# Patient Record
Sex: MALE | Race: OTHER | NOT HISPANIC OR LATINO
[De-identification: names, ages, dates, MRNs, and addresses within clinical notes are randomized per-mention and may not be internally consistent; named-entity substitution may affect disease eponyms.]

---

## 2021-02-18 ENCOUNTER — APPOINTMENT (OUTPATIENT)
Dept: UROLOGY | Facility: CLINIC | Age: 71
End: 2021-02-18

## 2021-03-18 ENCOUNTER — APPOINTMENT (OUTPATIENT)
Dept: UROLOGY | Facility: CLINIC | Age: 71
End: 2021-03-18
Payer: MEDICARE

## 2021-03-18 VITALS
WEIGHT: 262 LBS | SYSTOLIC BLOOD PRESSURE: 115 MMHG | TEMPERATURE: 98.3 F | OXYGEN SATURATION: 96 % | DIASTOLIC BLOOD PRESSURE: 74 MMHG | HEART RATE: 86 BPM | HEIGHT: 66 IN | BODY MASS INDEX: 42.11 KG/M2

## 2021-03-18 DIAGNOSIS — Z00.00 ENCOUNTER FOR GENERAL ADULT MEDICAL EXAMINATION W/OUT ABNORMAL FINDINGS: ICD-10-CM

## 2021-03-18 DIAGNOSIS — Z80.0 FAMILY HISTORY OF MALIGNANT NEOPLASM OF DIGESTIVE ORGANS: ICD-10-CM

## 2021-03-18 DIAGNOSIS — R60.0 LOCALIZED EDEMA: ICD-10-CM

## 2021-03-18 DIAGNOSIS — E11.9 TYPE 2 DIABETES MELLITUS W/OUT COMPLICATIONS: ICD-10-CM

## 2021-03-18 DIAGNOSIS — I50.9 HEART FAILURE, UNSPECIFIED: ICD-10-CM

## 2021-03-18 DIAGNOSIS — Z78.9 OTHER SPECIFIED HEALTH STATUS: ICD-10-CM

## 2021-03-18 DIAGNOSIS — Z83.3 FAMILY HISTORY OF DIABETES MELLITUS: ICD-10-CM

## 2021-03-18 DIAGNOSIS — I10 ESSENTIAL (PRIMARY) HYPERTENSION: ICD-10-CM

## 2021-03-18 DIAGNOSIS — I73.9 PERIPHERAL VASCULAR DISEASE, UNSPECIFIED: ICD-10-CM

## 2021-03-18 PROCEDURE — 76857 US EXAM PELVIC LIMITED: CPT

## 2021-03-18 PROCEDURE — 99205 OFFICE O/P NEW HI 60 MIN: CPT

## 2021-03-18 RX ORDER — LOSARTAN POTASSIUM 100 MG/1
TABLET, FILM COATED ORAL
Refills: 0 | Status: ACTIVE | COMMUNITY

## 2021-03-18 RX ORDER — EMPAGLIFLOZIN, LINAGLIPTIN, METFORMIN HYDROCHLORIDE 25; 5; 1000 MG/1; MG/1; MG/1
TABLET, EXTENDED RELEASE ORAL
Refills: 0 | Status: ACTIVE | COMMUNITY

## 2021-03-18 RX ORDER — FINASTERIDE 1 MG/1
TABLET ORAL
Refills: 0 | Status: ACTIVE | COMMUNITY

## 2021-03-18 RX ORDER — CALCITRIOL 0.25 UG/1
0.25 CAPSULE, LIQUID FILLED ORAL
Refills: 0 | Status: ACTIVE | COMMUNITY

## 2021-03-18 RX ORDER — CARVEDILOL 3.12 MG/1
TABLET, FILM COATED ORAL
Refills: 0 | Status: ACTIVE | COMMUNITY

## 2021-03-18 RX ORDER — ASPIRIN ENTERIC COATED TABLETS 81 MG 81 MG/1
81 TABLET, DELAYED RELEASE ORAL
Refills: 0 | Status: ACTIVE | COMMUNITY

## 2021-03-18 RX ORDER — EMPAGLIFLOZIN 25 MG/1
TABLET, FILM COATED ORAL
Refills: 0 | Status: ACTIVE | COMMUNITY

## 2021-03-18 RX ORDER — LANSOPRAZOLE 30 MG/1
CAPSULE, DELAYED RELEASE ORAL
Refills: 0 | Status: ACTIVE | COMMUNITY

## 2021-03-18 RX ORDER — GLIPIZIDE 2.5 MG/1
TABLET ORAL
Refills: 0 | Status: ACTIVE | COMMUNITY

## 2021-03-18 RX ORDER — TADALAFIL 5 MG/1
TABLET, FILM COATED ORAL
Refills: 0 | Status: ACTIVE | COMMUNITY

## 2021-03-18 NOTE — LETTER BODY
[Dear  ___] : Dear  [unfilled], [Consult Letter:] : I had the pleasure of evaluating your patient, [unfilled]. [Please see my note below.] : Please see my note below. [Consult Closing:] : Thank you very much for allowing me to participate in the care of this patient.  If you have any questions, please do not hesitate to contact me. [Sincerely,] : Sincerely, [FreeTextEntry3] : Shiraz Barnhart MD, FACS

## 2021-03-18 NOTE — HISTORY OF PRESENT ILLNESS
[FreeTextEntry1] : Mr. AURA DEL VALLE comes in today for a urologic evaluation.  He presents with a 5-6 year onset of obstructive and irritative voiding symptoms. He also has urge incontinence and wears 1 pad/day.  He was diagnosed with a urethral stricture for which he underwent two office dilations followed by an in-hospital dilation and direct visual internal laser urethrotomy (DVIU) in July 2020. The procedure was unsuccessful in alleviating his symptoms and a Rodriguez was placed emergently in September.  Then, in October he had a percutaneous cystostomy tube placed (in the OR by Dr. Owens using cystoscopic guidance).  Mr. Del Valle had another urethral office dilation in December.  His voiding symptoms have not improved. He has been on Flomax 0.4mg and Proscar for several years.  Cialis 5mg daily was added 6 mths ago. \par \par IPSS: 24/35\par Sono: 73cc PVR; Prostate 26cc\par \par Mr. Del Valle has ED, but is not sexually active. \par \par Creat:  2/17/21--2.07\par

## 2021-03-18 NOTE — ASSESSMENT
[FreeTextEntry1] : I discussed the findings and options with Mr. and . AURA LOPEZ in detail and reviewed the records provided, including two operative reports.  The next step should be the appropriate urethral imaging.  Prior to that, we will obtain a urinary flow rate.  I described the procedures and he will schedule them accordingly.\par \par I have also asked Mr. Lopez to please bring me his most recent PSA, which he reports as "normal".\par

## 2021-03-18 NOTE — ADDENDUM
[FreeTextEntry1] : A portion of this note was written by [Shane Webb] on 03/17/2021 acting as a scribe for Dr. Barnhart. \par \par I have personally reviewed the chart and agree that the record accurately reflects my personal performance of the history, physical exam, assessment, and plan.

## 2021-03-18 NOTE — PHYSICAL EXAM
[General Appearance - Well Developed] : well developed [General Appearance - Well Nourished] : well nourished [Normal Appearance] : normal appearance [Well Groomed] : well groomed [General Appearance - In No Acute Distress] : no acute distress [Respiration, Rhythm And Depth] : normal respiratory rhythm and effort [Exaggerated Use Of Accessory Muscles For Inspiration] : no accessory muscle use [Abdomen Tenderness] : non-tender [Costovertebral Angle Tenderness] : no ~M costovertebral angle tenderness [Urinary Bladder Findings] : the bladder was normal on palpation [Scrotum] : the scrotum was normal [Testes Mass (___cm)] : there were no testicular masses [No Prostate Nodules] : no prostate nodules [] : no rash [No Focal Deficits] : no focal deficits [Oriented To Time, Place, And Person] : oriented to person, place, and time [Affect] : the affect was normal [Mood] : the mood was normal [Not Anxious] : not anxious [No Palpable Adenopathy] : no palpable adenopathy [Heart Rate And Rhythm] : Heart rate and rhythm were normal [Abdomen Mass (___ Cm)] : no abdominal mass palpated [Penis Abnormality] : normal circumcised penis [Testes Tenderness] : no tenderness of the testes [Prostate Tenderness] : the prostate was not tender [FreeTextEntry1] : Extensive lower extremity discoloration.

## 2021-03-19 LAB
BILIRUB UR QL STRIP: NORMAL
CLARITY UR: CLEAR
COLLECTION METHOD: NORMAL
GLUCOSE UR-MCNC: 1000
HCG UR QL: 0.2 EU/DL
HGB UR QL STRIP.AUTO: NORMAL
KETONES UR-MCNC: NORMAL
LEUKOCYTE ESTERASE UR QL STRIP: NORMAL
NITRITE UR QL STRIP: POSITIVE
PH UR STRIP: 6
PROT UR STRIP-MCNC: NORMAL
SP GR UR STRIP: 1.01

## 2021-05-10 ENCOUNTER — APPOINTMENT (OUTPATIENT)
Dept: UROLOGY | Facility: CLINIC | Age: 71
End: 2021-05-10
Payer: MEDICARE

## 2021-05-10 VITALS
TEMPERATURE: 97.9 F | SYSTOLIC BLOOD PRESSURE: 160 MMHG | DIASTOLIC BLOOD PRESSURE: 79 MMHG | HEART RATE: 82 BPM | OXYGEN SATURATION: 95 %

## 2021-05-10 DIAGNOSIS — E66.01 MORBID (SEVERE) OBESITY DUE TO EXCESS CALORIES: ICD-10-CM

## 2021-05-10 LAB
BILIRUB UR QL STRIP: NORMAL
CLARITY UR: CLEAR
COLLECTION METHOD: NORMAL
GLUCOSE UR-MCNC: 1000
HCG UR QL: 0.2 EU/DL
HGB UR QL STRIP.AUTO: NORMAL
KETONES UR-MCNC: NORMAL
LEUKOCYTE ESTERASE UR QL STRIP: NORMAL
NITRITE UR QL STRIP: NORMAL
PH UR STRIP: 5
PROT UR STRIP-MCNC: NORMAL
SP GR UR STRIP: 1

## 2021-05-10 PROCEDURE — 51610 INJECTION FOR BLADDER X-RAY: CPT

## 2021-05-10 PROCEDURE — 51798 US URINE CAPACITY MEASURE: CPT

## 2021-05-10 PROCEDURE — 99215 OFFICE O/P EST HI 40 MIN: CPT | Mod: 25

## 2021-05-10 RX ORDER — ICOSAPENT ETHYL 1000 MG/1
1 CAPSULE ORAL
Qty: 120 | Refills: 0 | Status: ACTIVE | COMMUNITY
Start: 2021-04-12

## 2021-05-10 RX ORDER — GLIPIZIDE 10 MG/1
10 TABLET, FILM COATED, EXTENDED RELEASE ORAL
Qty: 90 | Refills: 0 | Status: ACTIVE | COMMUNITY
Start: 2021-03-16

## 2021-05-10 RX ORDER — EMPAGLIFLOZIN, METFORMIN HYDROCHLORIDE 25; 1000 MG/1; MG/1
25-1000 TABLET, EXTENDED RELEASE ORAL
Qty: 90 | Refills: 0 | Status: ACTIVE | COMMUNITY
Start: 2021-04-21

## 2021-05-10 RX ORDER — TADALAFIL 5 MG/1
5 TABLET ORAL
Qty: 90 | Refills: 0 | Status: ACTIVE | COMMUNITY
Start: 2020-07-13

## 2021-05-10 RX ORDER — SIMVASTATIN 5 MG/1
5 TABLET, FILM COATED ORAL
Qty: 90 | Refills: 0 | Status: ACTIVE | COMMUNITY
Start: 2021-04-19

## 2021-05-10 RX ORDER — CIPROFLOXACIN HYDROCHLORIDE 500 MG/1
500 TABLET, FILM COATED ORAL
Qty: 14 | Refills: 0 | Status: ACTIVE | COMMUNITY
Start: 2021-03-30

## 2021-05-10 RX ORDER — ASPIRIN 81 MG/1
81 TABLET, CHEWABLE ORAL
Qty: 90 | Refills: 0 | Status: ACTIVE | COMMUNITY
Start: 2020-12-14

## 2021-05-10 RX ORDER — OMEPRAZOLE 20 MG/1
20 CAPSULE, DELAYED RELEASE ORAL
Qty: 90 | Refills: 0 | Status: ACTIVE | COMMUNITY
Start: 2021-04-11

## 2021-05-10 RX ORDER — METOLAZONE 2.5 MG/1
2.5 TABLET ORAL
Qty: 26 | Refills: 0 | Status: ACTIVE | COMMUNITY
Start: 2021-04-28

## 2021-05-10 NOTE — HISTORY OF PRESENT ILLNESS
[FreeTextEntry1] : Mr. AURA DEL VALLE comes in today for a urologic evaluation and scheduled urethral imaging.  He presents with a 5-6 year onset of obstructive and irritative voiding symptoms. He also has urge incontinence and wears 1 pad/day.  He was diagnosed with a urethral stricture for which he underwent two office dilations followed by an in-hospital dilation and direct visual internal laser urethrotomy (DVIU) in July 2020. The procedure was unsuccessful in alleviating his symptoms and a Rodriguez was placed emergently in September.  Then, in October he had a percutaneous cystostomy tube placed (in the OR by Dr. Owens using cystoscopic guidance).  Mr. Del Valle had another urethral office dilation in December.  His voiding symptoms have not improved. He has been on Flomax 0.4mg and Proscar for several years.  Cialis 5mg daily was added 6 months ago. \par IPSS: 19/35\par Flow:  10ml/sec (187cc volume)\par \par Mr. Del Valle has ED, but is not sexually active. \par \par Creat:  2/17/21--2.07

## 2021-05-10 NOTE — ADDENDUM
[FreeTextEntry1] : A portion of this note was written by [Shane Webb] on 05/07/2021 acting as a scribe for Dr. Barnhart. \par \par I have personally reviewed the chart and agree that the record accurately reflects my personal performance of the history, physical exam, assessment, and plan.

## 2021-05-10 NOTE — PHYSICAL EXAM
[General Appearance - Well Developed] : well developed [General Appearance - Well Nourished] : well nourished [Normal Appearance] : normal appearance [Well Groomed] : well groomed [General Appearance - In No Acute Distress] : no acute distress [Abdomen Tenderness] : non-tender [Abdomen Mass (___ Cm)] : no abdominal mass palpated [Penis Abnormality] : normal circumcised penis [Urinary Bladder Findings] : the bladder was normal on palpation [Scrotum] : the scrotum was normal [Testes Tenderness] : no tenderness of the testes [Testes Mass (___cm)] : there were no testicular masses [Heart Rate And Rhythm] : Heart rate and rhythm were normal [] : no respiratory distress [Respiration, Rhythm And Depth] : normal respiratory rhythm and effort [Exaggerated Use Of Accessory Muscles For Inspiration] : no accessory muscle use [Oriented To Time, Place, And Person] : oriented to person, place, and time [Affect] : the affect was normal [Mood] : the mood was normal [Not Anxious] : not anxious [No Focal Deficits] : no focal deficits [No Palpable Adenopathy] : no palpable adenopathy [FreeTextEntry1] : Uses a cane to ambulate

## 2021-05-10 NOTE — ASSESSMENT
[FreeTextEntry1] : I discussed the findings and options with Mr. and . AURA LOPEZ in detail and reviewed the RUG (which resulted in cavernosal extravasation and significant bleeding).  The following options were outlined for his panurethral stricture:\par 1. Do nothing with close monitoring of his symptoms\par 2. Repeat an intraoperative dilation and focal DVIU as indicated\par 3. Perform a complex urethral reconstruction with sequential buccal grafts\par 4. Perform a perineal urethrostomy.\par \par Because of his comorbidities, major surgery may be risky.  He will decide how he wants to proceed and contact us.  Otherwise, I would like to see Mr. Lopez, electively, in 4 months (flow, bladder sono). \par He will continue with the triple therapy for BPE, understanding that their efficacy in the setting is questionable.\par \par A PSA and BMP are pending.\par

## 2021-05-11 LAB
ANION GAP SERPL CALC-SCNC: 15 MMOL/L
BUN SERPL-MCNC: 56 MG/DL
CALCIUM SERPL-MCNC: 8.6 MG/DL
CHLORIDE SERPL-SCNC: 95 MMOL/L
CO2 SERPL-SCNC: 28 MMOL/L
CREAT SERPL-MCNC: 1.61 MG/DL
GLUCOSE SERPL-MCNC: 402 MG/DL
POTASSIUM SERPL-SCNC: 4.3 MMOL/L
PSA SERPL-MCNC: 0.71 NG/ML
SODIUM SERPL-SCNC: 137 MMOL/L

## 2021-05-13 ENCOUNTER — NON-APPOINTMENT (OUTPATIENT)
Age: 71
End: 2021-05-13

## 2021-05-13 LAB — BACTERIA UR CULT: ABNORMAL

## 2021-06-22 ENCOUNTER — APPOINTMENT (OUTPATIENT)
Dept: ENDOCRINOLOGY | Facility: CLINIC | Age: 71
End: 2021-06-22

## 2021-11-29 ENCOUNTER — NON-APPOINTMENT (OUTPATIENT)
Age: 71
End: 2021-11-29

## 2021-11-29 ENCOUNTER — APPOINTMENT (OUTPATIENT)
Dept: UROLOGY | Facility: CLINIC | Age: 71
End: 2021-11-29
Payer: MEDICARE

## 2021-11-29 VITALS
DIASTOLIC BLOOD PRESSURE: 73 MMHG | SYSTOLIC BLOOD PRESSURE: 138 MMHG | HEART RATE: 82 BPM | TEMPERATURE: 97.9 F | HEIGHT: 66 IN | WEIGHT: 260 LBS | BODY MASS INDEX: 41.78 KG/M2 | OXYGEN SATURATION: 95 %

## 2021-11-29 PROCEDURE — 51741 ELECTRO-UROFLOWMETRY FIRST: CPT

## 2021-11-29 PROCEDURE — 99215 OFFICE O/P EST HI 40 MIN: CPT

## 2021-11-29 PROCEDURE — 51798 US URINE CAPACITY MEASURE: CPT

## 2021-11-29 NOTE — ASSESSMENT
[FreeTextEntry1] : I discussed the findings and options with Mr. and Mrs. AURA LOPEZ in detail, as follows:\par 1. Do nothing with close monitoring of his symptoms\par 2. Repeat an intraoperative dilation and focal DVIU as indicated (understanding this will unlikely result in any significant improvement).\par 3. Perform a complex urethral reconstruction with sequential buccal grafts\par 4. Perform a perineal urethrostomy.\par \par Mr. Lopez does not want to proceed with any intervention.  He will continue with the triple therapy for BPE, understanding that their efficacy in the setting is likely minimal. We agreed that he would return in 6 months (flow, bladders sono, PSA). \par \par \par

## 2021-11-29 NOTE — PHYSICAL EXAM
[Costovertebral Angle Tenderness] : no ~M costovertebral angle tenderness [Prostate Tenderness] : the prostate was not tender [No Prostate Nodules] : no prostate nodules [FreeTextEntry1] : Uses a cane to ambulate

## 2021-11-29 NOTE — HISTORY OF PRESENT ILLNESS
[FreeTextEntry1] : Mr. AURA DEL VALLE comes in today for urologic followup.   He presents with a 5-6 year onset of obstructive and irritative voiding symptoms. He also has urge incontinence and wears 2 pad/day.  The symptoms have increased since his last visit in May 2021.\par \par He was diagnosed with a urethral stricture for which he underwent two office dilations followed by an in-hospital dilation and direct visual internal laser urethrotomy (DVIU) in July 2020. The procedure was unsuccessful in alleviating his symptoms and a Rodriguez was placed emergently in September 2020.  Then, in October he had a percutaneous cystostomy tube placed (in the OR by Dr. Owens using cystoscopic guidance).  Mr. Del Valle had another urethral office dilation in December 2020.  His voiding symptoms have not improved. He has been on Flomax 0.4mg and Proscar for several years. Cialis 5mg daily was added 6 months ago. A RUG in May 2021 confirmed an extensive urethral stricture sparing only the proximal bulbar segment. \par IPSS: 26/35\par Sono: 175cc PVR\par Flow:  inaccurate reading\par \par Mr. Del Valle has ED, but is not sexually active. \par \par PSA:  5/11/21--0.71\par \par Creat:  5/11/21--1.61; 2/17/21--2.07

## 2021-12-02 LAB — BACTERIA UR CULT: ABNORMAL

## 2022-01-26 ENCOUNTER — NON-APPOINTMENT (OUTPATIENT)
Age: 72
End: 2022-01-26

## 2022-05-26 ENCOUNTER — APPOINTMENT (OUTPATIENT)
Dept: UROLOGY | Facility: CLINIC | Age: 72
End: 2022-05-26
Payer: MEDICARE

## 2022-05-26 PROCEDURE — 51798 US URINE CAPACITY MEASURE: CPT

## 2022-05-26 PROCEDURE — 99215 OFFICE O/P EST HI 40 MIN: CPT

## 2022-05-26 PROCEDURE — 81003 URINALYSIS AUTO W/O SCOPE: CPT | Mod: QW

## 2022-05-26 PROCEDURE — 51741 ELECTRO-UROFLOWMETRY FIRST: CPT

## 2022-05-26 NOTE — ASSESSMENT
[FreeTextEntry1] : I discussed the findings and options with Mr. and . AURA LOPEZ in detail.  He seems to be managing satisfactorily with the triple combination therapy. His postvoid residuals, albeit elevated, have remained stable.  The incontinence is a problem as it has affected his overall hygiene.\par \par They understand that the options are quite limited in this setting with the best possibly being a perineal urethrostomy if he wishes to proceed with surgical intervention.  For now, he will continue with the status quo and return, electively, in 6 months (flow, bladder sono).  We will call Mr. Lopez with the PSA result.\par \par \par \par \par

## 2022-05-26 NOTE — PHYSICAL EXAM
[General Appearance - Well Developed] : well developed [General Appearance - Well Nourished] : well nourished [Normal Appearance] : normal appearance [Well Groomed] : well groomed [General Appearance - In No Acute Distress] : no acute distress [Abdomen Tenderness] : non-tender [Abdomen Mass (___ Cm)] : no abdominal mass palpated [Costovertebral Angle Tenderness] : no ~M costovertebral angle tenderness [Penis Abnormality] : normal circumcised penis [Urinary Bladder Findings] : the bladder was normal on palpation [Scrotum] : the scrotum was normal [Testes Tenderness] : no tenderness of the testes [Testes Mass (___cm)] : there were no testicular masses [Prostate Tenderness] : the prostate was not tender [No Prostate Nodules] : no prostate nodules [Heart Rate And Rhythm] : Heart rate and rhythm were normal [] : no respiratory distress [Respiration, Rhythm And Depth] : normal respiratory rhythm and effort [Exaggerated Use Of Accessory Muscles For Inspiration] : no accessory muscle use [Oriented To Time, Place, And Person] : oriented to person, place, and time [Affect] : the affect was normal [Mood] : the mood was normal [Not Anxious] : not anxious [No Focal Deficits] : no focal deficits [No Palpable Adenopathy] : no palpable adenopathy [FreeTextEntry1] : Uses a cane to ambulate

## 2022-05-26 NOTE — HISTORY OF PRESENT ILLNESS
[FreeTextEntry1] : Mr. AURA LOPEZ comes in today for urologic followup.  He reports a continuation of his longstanding obstructive and irritative voiding symptoms. He also has urge incontinence and wears 2 pads/day, which are soiled.  He denies any gross hematuria or dysuria. \par \par Mr. Lopez was diagnosed with a urethral stricture for which he underwent two office dilations followed by an in-hospital dilation and direct visual internal laser urethrotomy (DVIU) in July 2020. The procedure was unsuccessful in alleviating his symptoms and a Rodriguez was placed emergently in September 2020.  Then, in October he had a percutaneous cystostomy tube placed (in the OR by Dr. Owens using cystoscopic guidance).  Mr. Lopez had another urethral office dilation in December 2020.  His voiding symptoms have not improved. He has been on Flomax 0.4mg and Proscar for several years. Cialis 5mg daily was added 6 months ago. A RUG in May 2021 confirmed an extensive urethral stricture sparing only the proximal bulbar segment. \par IPSS: 18/35\par Sono (performed to assess bladder emptying): 196cc PVR (Nov 2021--175cc PVR)\par Flow: Peak: 5.2ml/sec, Volume 114.6cc\par \par Mr. Lopez has ED, but is not sexually active. \par \par PSA:  5/11/21--0.71\par \par Creat:  5/11/21--1.61; 2/17/21--2.07

## 2022-05-26 NOTE — ADDENDUM
[FreeTextEntry1] : A portion of this note was written by [Shane Webb] on 05/25/2022 acting as a scribe for Dr. Barnhart. \par \par I have personally reviewed the chart and agree that the record accurately reflects my personal performance of the history, physical exam, assessment, and plan.

## 2022-05-27 LAB
BILIRUB UR QL STRIP: NORMAL
CLARITY UR: CLEAR
COLLECTION METHOD: NORMAL
GLUCOSE UR-MCNC: NORMAL
HCG UR QL: 0.2 EU/DL
HGB UR QL STRIP.AUTO: NORMAL
KETONES UR-MCNC: NORMAL
LEUKOCYTE ESTERASE UR QL STRIP: NORMAL
NITRITE UR QL STRIP: NORMAL
PH UR STRIP: 5.5
PROT UR STRIP-MCNC: NORMAL
PSA SERPL-MCNC: 0.27 NG/ML
SP GR UR STRIP: 1.01

## 2022-05-31 ENCOUNTER — NON-APPOINTMENT (OUTPATIENT)
Age: 72
End: 2022-05-31

## 2022-06-02 LAB — BACTERIA UR CULT: ABNORMAL

## 2022-07-26 ENCOUNTER — APPOINTMENT (OUTPATIENT)
Dept: ENDOCRINOLOGY | Facility: CLINIC | Age: 72
End: 2022-07-26

## 2022-08-23 ENCOUNTER — APPOINTMENT (OUTPATIENT)
Dept: UROLOGY | Facility: CLINIC | Age: 72
End: 2022-08-23

## 2022-08-23 ENCOUNTER — APPOINTMENT (OUTPATIENT)
Dept: UROLOGY | Facility: CLINIC | Age: 72
End: 2022-08-23
Payer: MEDICARE

## 2022-08-23 LAB
BILIRUB UR QL STRIP: NORMAL
GLUCOSE UR-MCNC: NORMAL
HCG UR QL: 0.2 EU/DL
HGB UR QL STRIP.AUTO: NORMAL
KETONES UR-MCNC: NORMAL
LEUKOCYTE ESTERASE UR QL STRIP: NORMAL
NITRITE UR QL STRIP: NORMAL
PH UR STRIP: 5
PROT UR STRIP-MCNC: NORMAL
SP GR UR STRIP: 1.01

## 2022-08-23 PROCEDURE — 99215 OFFICE O/P EST HI 40 MIN: CPT

## 2022-08-23 PROCEDURE — 51798 US URINE CAPACITY MEASURE: CPT

## 2022-08-23 NOTE — HISTORY OF PRESENT ILLNESS
[FreeTextEntry1] : Mr. AURA LOPEZ comes in today for urgent urologic followup. He has know urethral stricture with elevated PVRs and incontinence (See below). In the last few weeks, he has had significantly more incontinence with increasing difficulty voiding. He feels his bladder is full and is uncomfortable. No gross hematuria or dysuria, fevers or chills.\par \par  cc, IPSS 26\par \par Prior history: Mr. Lopez was diagnosed with a urethral stricture for which he underwent two office dilations followed by an in-hospital dilation and direct visual internal laser urethrotomy (DVIU) in July 2020. The procedure was unsuccessful in alleviating his symptoms and a Rodriguez was placed emergently in September 2020. Then, in October he had a percutaneous cystostomy tube placed (in the OR by Dr. Owens using cystoscopic guidance). Mr. Lopez had another urethral office dilation in December 2020. His voiding symptoms have not improved. He has been on Flomax 0.4mg and Proscar for several years. Cialis 5mg daily was added 6 months ago. A RUG in May 2021 confirmed an extensive urethral stricture sparing only the proximal bulbar segment. \par IPSS: 18/35\par Sono (performed to assess bladder emptying): 196cc PVR (Nov 2021--175cc PVR)\par Flow: Peak: 5.2ml/sec, Volume 114.6cc\par \par Mr. Lopez has ED, but is not sexually active. \par \par PSA: 5/11/21--0.71; 5/26/22--0.27\par \par Creat: 5/11/21--1.61; 2/17/21--2.07 \par

## 2022-08-23 NOTE — PHYSICAL EXAM
[General Appearance - Well Developed] : well developed [General Appearance - Well Nourished] : well nourished [Normal Appearance] : normal appearance [Well Groomed] : well groomed [General Appearance - In No Acute Distress] : no acute distress [Abdomen Soft] : soft [Abdomen Tenderness] : non-tender [Costovertebral Angle Tenderness] : no ~M costovertebral angle tenderness [Urethral Meatus] : meatus normal [Urinary Bladder Findings] : the bladder was normal on palpation [Scrotum] : the scrotum was normal [Testes Mass (___cm)] : there were no testicular masses [Edema] : no peripheral edema [] : no respiratory distress [Respiration, Rhythm And Depth] : normal respiratory rhythm and effort [Exaggerated Use Of Accessory Muscles For Inspiration] : no accessory muscle use [Oriented To Time, Place, And Person] : oriented to person, place, and time [Affect] : the affect was normal [Mood] : the mood was normal [Not Anxious] : not anxious [Normal Station and Gait] : the gait and station were normal for the patient's age [No Focal Deficits] : no focal deficits [FreeTextEntry1] : Large belly, bladder not palpable

## 2022-08-23 NOTE — ASSESSMENT
[FreeTextEntry1] : 72 year old man with know long anterior urethral stricture presents with increasing incontinence and difficulty urinating. PVRs today are >550 cc, from < 200 cc prior. It is likely that the patient is overflowing leading to very frequent incontinence. I think he would benefit from bladder drainage until Dr. Barnhart returns and more definitive procedure can be discussed. We discussed options including cystoscopic catheter placement that will require urethral dilation vs SPT. Patient favors SPT placement. This will need to be performed with IR. We called IR to arrange for SPT placement tomorrow. If patient unable to void by then, he should present to emergency room for more urgent evaluation.\par  - IR for SPT placement\par  - Pre-procedure bloodwork obtained\par  - Begin cipro x 5 days kvng-procedure\par  - UA, UCx

## 2022-08-24 ENCOUNTER — APPOINTMENT (OUTPATIENT)
Dept: INTERVENTIONAL RADIOLOGY/VASCULAR | Facility: HOSPITAL | Age: 72
End: 2022-08-24

## 2022-08-24 ENCOUNTER — EMERGENCY (EMERGENCY)
Facility: HOSPITAL | Age: 72
LOS: 1 days | Discharge: HOME | End: 2022-08-24
Attending: EMERGENCY MEDICINE | Admitting: EMERGENCY MEDICINE
Payer: MEDICARE

## 2022-08-24 VITALS
TEMPERATURE: 98 F | OXYGEN SATURATION: 97 % | RESPIRATION RATE: 17 BRPM | SYSTOLIC BLOOD PRESSURE: 127 MMHG | DIASTOLIC BLOOD PRESSURE: 70 MMHG | HEART RATE: 67 BPM

## 2022-08-24 VITALS
HEART RATE: 68 BPM | DIASTOLIC BLOOD PRESSURE: 76 MMHG | SYSTOLIC BLOOD PRESSURE: 119 MMHG | RESPIRATION RATE: 18 BRPM | OXYGEN SATURATION: 96 % | TEMPERATURE: 98 F

## 2022-08-24 DIAGNOSIS — E11.65 TYPE 2 DIABETES MELLITUS WITH HYPERGLYCEMIA: ICD-10-CM

## 2022-08-24 DIAGNOSIS — R33.9 RETENTION OF URINE, UNSPECIFIED: ICD-10-CM

## 2022-08-24 DIAGNOSIS — Z20.822 CONTACT WITH AND (SUSPECTED) EXPOSURE TO COVID-19: ICD-10-CM

## 2022-08-24 LAB
ALBUMIN SERPL ELPH-MCNC: 4.2 G/DL — SIGNIFICANT CHANGE UP (ref 3.3–5)
ALBUMIN SERPL ELPH-MCNC: 4.3 G/DL
ALP BLD-CCNC: 96 U/L
ALP SERPL-CCNC: 92 U/L — SIGNIFICANT CHANGE UP (ref 40–120)
ALT FLD-CCNC: 16 U/L — SIGNIFICANT CHANGE UP (ref 10–45)
ALT SERPL-CCNC: 17 U/L
ANION GAP SERPL CALC-SCNC: 11 MMOL/L — SIGNIFICANT CHANGE UP (ref 5–17)
ANION GAP SERPL CALC-SCNC: 16 MMOL/L
APPEARANCE UR: ABNORMAL
APTT BLD: 31.3 SEC
AST SERPL-CCNC: 14 U/L — SIGNIFICANT CHANGE UP (ref 10–40)
AST SERPL-CCNC: 20 U/L
BACTERIA # UR AUTO: PRESENT /HPF
BASOPHILS # BLD AUTO: 0.05 K/UL — SIGNIFICANT CHANGE UP (ref 0–0.2)
BASOPHILS # BLD AUTO: 0.06 K/UL
BASOPHILS NFR BLD AUTO: 0.6 % — SIGNIFICANT CHANGE UP (ref 0–2)
BASOPHILS NFR BLD AUTO: 0.7 %
BILIRUB SERPL-MCNC: 0.4 MG/DL
BILIRUB SERPL-MCNC: 0.5 MG/DL — SIGNIFICANT CHANGE UP (ref 0.2–1.2)
BILIRUB UR-MCNC: NEGATIVE — SIGNIFICANT CHANGE UP
BUN SERPL-MCNC: 51 MG/DL
BUN SERPL-MCNC: 56 MG/DL — HIGH (ref 7–23)
CALCIUM SERPL-MCNC: 9.2 MG/DL — SIGNIFICANT CHANGE UP (ref 8.4–10.5)
CALCIUM SERPL-MCNC: 9.3 MG/DL
CHLORIDE SERPL-SCNC: 96 MMOL/L
CHLORIDE SERPL-SCNC: 99 MMOL/L — SIGNIFICANT CHANGE UP (ref 96–108)
CO2 SERPL-SCNC: 24 MMOL/L
CO2 SERPL-SCNC: 25 MMOL/L — SIGNIFICANT CHANGE UP (ref 22–31)
COLOR SPEC: YELLOW — SIGNIFICANT CHANGE UP
CREAT SERPL-MCNC: 2.08 MG/DL — HIGH (ref 0.5–1.3)
CREAT SERPL-MCNC: 2.25 MG/DL
DIFF PNL FLD: ABNORMAL
EGFR: 31 ML/MIN/1.73M2
EGFR: 34 ML/MIN/1.73M2 — LOW
EOSINOPHIL # BLD AUTO: 0.11 K/UL
EOSINOPHIL # BLD AUTO: 0.11 K/UL — SIGNIFICANT CHANGE UP (ref 0–0.5)
EOSINOPHIL NFR BLD AUTO: 1.3 %
EOSINOPHIL NFR BLD AUTO: 1.3 % — SIGNIFICANT CHANGE UP (ref 0–6)
EPI CELLS # UR: ABNORMAL /HPF (ref 0–5)
GLUCOSE SERPL-MCNC: 283 MG/DL — HIGH (ref 70–99)
GLUCOSE SERPL-MCNC: 372 MG/DL
GLUCOSE UR QL: >=1000
HCT VFR BLD CALC: 37.5 % — LOW (ref 39–50)
HCT VFR BLD CALC: 40.4 %
HGB BLD-MCNC: 12.1 G/DL
HGB BLD-MCNC: 12.2 G/DL — LOW (ref 13–17)
IMM GRANULOCYTES NFR BLD AUTO: 0.2 % — SIGNIFICANT CHANGE UP (ref 0–1.5)
IMM GRANULOCYTES NFR BLD AUTO: 0.5 %
INR PPP: 0.97 RATIO
KETONES UR-MCNC: NEGATIVE — SIGNIFICANT CHANGE UP
LEUKOCYTE ESTERASE UR-ACNC: ABNORMAL
LYMPHOCYTES # BLD AUTO: 1.7 K/UL
LYMPHOCYTES # BLD AUTO: 1.88 K/UL — SIGNIFICANT CHANGE UP (ref 1–3.3)
LYMPHOCYTES # BLD AUTO: 21.8 % — SIGNIFICANT CHANGE UP (ref 13–44)
LYMPHOCYTES NFR BLD AUTO: 20.2 %
MAN DIFF?: NORMAL
MCHC RBC-ENTMCNC: 27.9 PG
MCHC RBC-ENTMCNC: 28 PG — SIGNIFICANT CHANGE UP (ref 27–34)
MCHC RBC-ENTMCNC: 30 GM/DL
MCHC RBC-ENTMCNC: 32.5 GM/DL — SIGNIFICANT CHANGE UP (ref 32–36)
MCV RBC AUTO: 86 FL — SIGNIFICANT CHANGE UP (ref 80–100)
MCV RBC AUTO: 93.1 FL
MONOCYTES # BLD AUTO: 0.53 K/UL
MONOCYTES # BLD AUTO: 0.6 K/UL — SIGNIFICANT CHANGE UP (ref 0–0.9)
MONOCYTES NFR BLD AUTO: 6.3 %
MONOCYTES NFR BLD AUTO: 7 % — SIGNIFICANT CHANGE UP (ref 2–14)
NEUTROPHILS # BLD AUTO: 5.95 K/UL — SIGNIFICANT CHANGE UP (ref 1.8–7.4)
NEUTROPHILS # BLD AUTO: 5.97 K/UL
NEUTROPHILS NFR BLD AUTO: 69.1 % — SIGNIFICANT CHANGE UP (ref 43–77)
NEUTROPHILS NFR BLD AUTO: 71 %
NITRITE UR-MCNC: POSITIVE
NRBC # BLD: 0 /100 WBCS — SIGNIFICANT CHANGE UP (ref 0–0)
PH UR: 6 — SIGNIFICANT CHANGE UP (ref 5–8)
PLATELET # BLD AUTO: 218 K/UL — SIGNIFICANT CHANGE UP (ref 150–400)
PLATELET # BLD AUTO: 233 K/UL
POTASSIUM SERPL-MCNC: 4 MMOL/L — SIGNIFICANT CHANGE UP (ref 3.5–5.3)
POTASSIUM SERPL-SCNC: 4 MMOL/L
POTASSIUM SERPL-SCNC: 4 MMOL/L — SIGNIFICANT CHANGE UP (ref 3.5–5.3)
PROT SERPL-MCNC: 7.3 G/DL
PROT SERPL-MCNC: 7.4 G/DL — SIGNIFICANT CHANGE UP (ref 6–8.3)
PROT UR-MCNC: ABNORMAL MG/DL
PT BLD: 11.3 SEC
RBC # BLD: 4.34 M/UL
RBC # BLD: 4.36 M/UL — SIGNIFICANT CHANGE UP (ref 4.2–5.8)
RBC # FLD: 14.5 % — SIGNIFICANT CHANGE UP (ref 10.3–14.5)
RBC # FLD: 15.2 %
RBC CASTS # UR COMP ASSIST: < 5 /HPF — SIGNIFICANT CHANGE UP
SARS-COV-2 N GENE NPH QL NAA+PROBE: NOT DETECTED
SARS-COV-2 RNA SPEC QL NAA+PROBE: NEGATIVE — SIGNIFICANT CHANGE UP
SODIUM SERPL-SCNC: 135 MMOL/L — SIGNIFICANT CHANGE UP (ref 135–145)
SODIUM SERPL-SCNC: 137 MMOL/L
SP GR SPEC: 1.02 — SIGNIFICANT CHANGE UP (ref 1–1.03)
UROBILINOGEN FLD QL: 0.2 E.U./DL — SIGNIFICANT CHANGE UP
WBC # BLD: 8.61 K/UL — SIGNIFICANT CHANGE UP (ref 3.8–10.5)
WBC # FLD AUTO: 8.41 K/UL
WBC # FLD AUTO: 8.61 K/UL — SIGNIFICANT CHANGE UP (ref 3.8–10.5)
WBC UR QL: ABNORMAL /HPF

## 2022-08-24 PROCEDURE — 51102 DRAIN BL W/CATH INSERTION: CPT

## 2022-08-24 PROCEDURE — 85025 COMPLETE CBC W/AUTO DIFF WBC: CPT

## 2022-08-24 PROCEDURE — 87635 SARS-COV-2 COVID-19 AMP PRB: CPT

## 2022-08-24 PROCEDURE — 99284 EMERGENCY DEPT VISIT MOD MDM: CPT | Mod: 25

## 2022-08-24 PROCEDURE — 76942 ECHO GUIDE FOR BIOPSY: CPT

## 2022-08-24 PROCEDURE — C1769: CPT

## 2022-08-24 PROCEDURE — C1729: CPT

## 2022-08-24 PROCEDURE — 96374 THER/PROPH/DIAG INJ IV PUSH: CPT | Mod: XU

## 2022-08-24 PROCEDURE — 81001 URINALYSIS AUTO W/SCOPE: CPT

## 2022-08-24 PROCEDURE — 80053 COMPREHEN METABOLIC PANEL: CPT

## 2022-08-24 PROCEDURE — 36415 COLL VENOUS BLD VENIPUNCTURE: CPT

## 2022-08-24 PROCEDURE — 82962 GLUCOSE BLOOD TEST: CPT

## 2022-08-24 PROCEDURE — 87086 URINE CULTURE/COLONY COUNT: CPT

## 2022-08-24 PROCEDURE — 76942 ECHO GUIDE FOR BIOPSY: CPT | Mod: 26

## 2022-08-24 PROCEDURE — 99284 EMERGENCY DEPT VISIT MOD MDM: CPT | Mod: FS

## 2022-08-24 RX ORDER — SODIUM CHLORIDE 9 MG/ML
1000 INJECTION INTRAMUSCULAR; INTRAVENOUS; SUBCUTANEOUS ONCE
Refills: 0 | Status: DISCONTINUED | OUTPATIENT
Start: 2022-08-24 | End: 2022-08-24

## 2022-08-24 RX ORDER — INSULIN HUMAN 100 [IU]/ML
2 INJECTION, SOLUTION SUBCUTANEOUS ONCE
Refills: 0 | Status: COMPLETED | OUTPATIENT
Start: 2022-08-24 | End: 2022-08-24

## 2022-08-24 RX ADMIN — INSULIN HUMAN 2 UNIT(S): 100 INJECTION, SOLUTION SUBCUTANEOUS at 13:16

## 2022-08-24 NOTE — ED ADULT NURSE REASSESSMENT NOTE - NS ED NURSE REASSESS COMMENT FT1
Urinary catheter emptied  50ml output, vanessa urine.  Catheter care instructions given to patient and wife and Urology follow up.  Discharged to home in stable condition.

## 2022-08-24 NOTE — ED PROVIDER NOTE - OBJECTIVE STATEMENT
71 y/o m hx urinary retention, DM presents sent from IR after pt presented for scheduled outpatient suprapubic catheter procedure and found to have elevated glucose.  Pt has no complaints, feeling well.  Spoke with IR who wants pt's blood glucose to be below 250 prior to the procedure.

## 2022-08-24 NOTE — ED ADULT NURSE NOTE - CCCP TRG CHIEF CMPLNT
hyperglycemia Keystone Flap Text: The defect edges were debeveled with a #15 scalpel blade.  Given the location of the defect, shape of the defect a keystone flap was deemed most appropriate.  Using a sterile surgical marker, an appropriate keystone flap was drawn incorporating the defect, outlining the appropriate donor tissue and placing the expected incisions within the relaxed skin tension lines where possible. The area thus outlined was incised deep to adipose tissue with a #15 scalpel blade.  The skin margins were undermined to an appropriate distance in all directions around the primary defect and laterally outward around the flap utilizing iris scissors.

## 2022-08-24 NOTE — ED PROVIDER NOTE - PATIENT PORTAL LINK FT
You can access the FollowMyHealth Patient Portal offered by [facility] by registering at the following website: [email or web address]. By joining [facility]’s FollowMyHealth portal, you will also be able to view your health information using other applications (apps) compatible with our system.

## 2022-08-24 NOTE — ED ADULT TRIAGE NOTE - CHIEF COMPLAINT QUOTE
Pt brought from IR for hyperglycemia, . Pt due to have suprapubic catheter placement today, unable to have procedure d/t elevated glucose. denies complaints on arrival. hx DM not on insulin.

## 2022-08-24 NOTE — ED ADULT NURSE REASSESSMENT NOTE - NS ED NURSE REASSESS COMMENT FT1
Patient a/oX3, no pain or any other symptom complaint.  Vital signs stable.  Righty FA PIV #20 in place, all labs sent, no complications.  FSBG 259 in ED;  Regular Insulin 2 units IVP adminsitered; repeat FSBG 209.  Endorsement report received by JOSHUA LING;  transport back to  pending.  NPO observed.  Will continue to monitor.

## 2022-08-24 NOTE — ED ADULT NURSE REASSESSMENT NOTE - NS ED NURSE REASSESS COMMENT FT1
Received report from IR JOSHUA Jackson, patient returning to ER after around 30 min., s/p suprapubic catheter insertion, urine output 400cc, cloudy, patient tolerated procedure well with vitals /58  RH 65  RR 10  O2 sat 97% on RA.  HEIDI Saucedo made aware.

## 2022-08-24 NOTE — ED PROVIDER NOTE - CLINICAL SUMMARY MEDICAL DECISION MAKING FREE TEXT BOX
73 y/o m hx urinary retention, DM presents sent from IR after he came to hospital for suprapubic catheter but fingerstick was 270.  VSS in ED, labs with glucose 283, no anion gap.  Pt given 2 units insulin and glucose downtrended to 209 (beneath 250ng/dl) which is the cutoff for an IR procedure.  Pt taken to IR and catheter placed, will d/c to f/u with his urologist.

## 2022-08-24 NOTE — ED PROVIDER NOTE - NSFOLLOWUPINSTRUCTIONS_ED_ALL_ED_FT
Suprapubic Catheter Home Guide      A suprapubic catheter is a flexible tube that is used to drain urine from the bladder into a collection bag outside the body. The catheter is inserted into the bladder through a small opening in the lower abdomen, above the pubic bone (suprapubic area) and a few inches below your belly button (navel). A tiny balloon filled with germ-free (sterile) water helps to keep the catheter in place.    The collection bag must be emptied at least once a day and cleaned at least every other day. The collection bag can be put beside your bed at night and attached to your leg during the day. You may have a large collection bag to use at night and a smaller one to use during the day.    Your suprapubic catheter may need to be changed every 4–6 weeks, or as often as recommended by your health care provider. Healing of the tract where the catheter is placed can take 6 weeks to 6 months. During that time, your health care provider may change your catheter. Once the tract is well healed, you or a caregiver will change your suprapubic catheter at home.      What are the risks?    This catheter is safe to use. However, problems can occur, including:  •Blocked urine flow. This can occur if the catheter stops working, or if you have a blood clot in your bladder or in the catheter.      •Irritation of the skin around the catheter.      •Infection. This can happen if bacteria gets into your bladder.        Supplies needed:    •Two pairs of sterile gloves.      •Paper towels.      •Catheter.      •Two syringes.      •Sterile water.      •Sterile cleaning solution.      •Lubricant.      •Collection bags.        How to change the catheter     1.Drink plenty of fluids during the hours before you change the catheter.      2.Wash your hands with soap and water. If soap and water are not available, use hand .      3.Draw up sterile water into a syringe to have ready to fill the new catheter balloon. The amount will depend on the size of the balloon.      4.Have all of your supplies ready and close to you on a paper towel.      5.Lie on your back, sitting slightly upright so that you can see the catheter and opening.      6.Put on sterile gloves.      7.Clean the skin around the catheter opening using the sterile cleaning solution.      8.Remove the water from the balloon in the catheter using a syringe.    9.Slowly remove the catheter. If the catheter seems stuck, or if you have difficulty removing it:  •Do not pull on it.      •Call your health care provider right away.        10.Place the old catheter on a paper towel to discard later.      11.Take off the used gloves, and put on a new pair.      12.Put lubricant on the end of the new catheter that will go into your bladder.      13.Clean the skin around the catheter opening using the sterile cleaning solution.      14.Gently slide the catheter through the opening in your abdomen and into the tract that leads to your bladder.      15.Wait for some urine to start flowing through the catheter.      16.When urine starts to flow through the catheter, attach the collection bag to the end of the catheter. Make sure the connection is tight.      17.Use a syringe to fill the catheter balloon with sterile water. Fill to the amount directed by your health care provider.      18.Remove the gloves and wash your hands with soap and water.        How to care for the skin around the catheter     Follow your health care provider's instructions on caring for your skin.  •Use a clean washcloth and soapy water to clean the skin around your catheter every day. Pat the area dry with a clean paper towel.      •Do not pull on the catheter.      •Do not use ointment or lotion on this area, unless told by your health care provider.    •Check the skin around the catheter every day for signs of infection. Check for:  •Redness, swelling, or pain.      •Fluid or blood.      •Warmth.      •Pus or a bad smell.          How to empty and clean the collection bag    Empty the large collection bag every 8 hours. Empty the small collection bag when it is about ? full. Clean the collection bag every 2–3 days, or as often as told by your health care provider. To do this:  1.Wash your hands with soap and water. If soap and water are not available, use hand .      2.Disconnect the bag from the catheter and immediately attach a new bag to the catheter.      3.Hold the used bag over the toilet or another container.    4.Turn the valve (spigot) at the bottom of the bag to empty the urine. Empty the used bag completely.  •Do not touch the opening of the spigot.      •Do not let the opening touch the toilet or container.        5.Close the spigot tightly when the bag is empty.    6.Clean the used bag in one of the following methods:  •According to the 's instructions.      •As told by your health care provider.        7.Let the bag dry completely. Put it in a clean plastic bag before storing it.        General tips     •Always wash your hands before and after caring for your catheter and collection bag. Use a mild, fragrance-free soap. If soap and water are not available, use hand .      •Clean the outside of the catheter with soap and water as often as told by your health care provider.      •Always make sure there are no twists or kinks in the catheter tube.      •Always make sure there are no leaks in the catheter or collection bag.      •Always wear the leg bag below your knee.      •Make sure the overnight drainage bag is always lower than the level of your bladder, but do not let it touch the floor. Before you go to sleep, hang the bag inside a wastebasket that is covered by a clean plastic bag.      •Drink enough fluid to keep your urine pale yellow.      • Do not take baths, swim, or use a hot tub until your health care provider approves. Ask your health care provider if you may take showers.        Contact a harshil care provider if:    •You leak urine.      •You have redness, swelling, or pain around your catheter.      •You have fluid or blood coming from your catheter opening.      •Your catheter opening feels warm to the touch.      •You have pus or a bad smell coming from your catheter opening.      •You have a fever or chills.      •Your urine flow slows down.      •Your urine becomes cloudy or smelly.        Get help right away if:    •Your catheter comes out.    •You have:  •Nausea.      •Back pain.      •Difficulty changing your catheter.      •Blood in your urine.      •No urine flow for 1 hour.          Summary    •A suprapubic catheter is a flexible tube that is used to drain urine from the bladder into a collection bag outside the body.      •Your suprapubic catheter may need to be changed every 4–6 weeks, or as recommended by your health care provider.      •Follow instructions on how to change the catheter and how to empty and clean the collection bag.      •Always wash your hands before and after caring for your catheter and collection bag. Drink enough fluid to keep your urine pale yellow.      •Get help right away if you have difficulty changing your catheter or if there is blood in your urine.      This information is not intended to replace advice given to you by your health care provider. Make sure you discuss any questions you have with your health care provider.

## 2022-08-24 NOTE — ED ADULT NURSE REASSESSMENT NOTE - NS ED NURSE REASSESS COMMENT FT1
Patient back from IR, received report from RN Luda  Recovery RN.  Patient a/o X 3, VSS, no c/o of pain or any other symptom.  Suprapubic catheter in place, dressing intact, Hebrew #16, draining cloudy urine, good output.

## 2022-08-24 NOTE — ED ADULT NURSE NOTE - OBJECTIVE STATEMENT
Patient scheduled for suprapubic placement in IR due to urinary retention, noted elevated blood glucose 270 sent to ED for further evaluation and treatment.  PMHx DM BPH HLD CAD.  Denies any dizziness, nausea or pain, no diaphoresis.  States only has difficulty voiding.  FSBG in .

## 2022-08-24 NOTE — ED PROVIDER NOTE - NS ED ATTENDING STATEMENT MOD
I have seen and examined this patient and fully participated in the care of this patient as the teaching attending.  The service was shared with the ANGELI.  I reviewed and verified the documentation and independently performed the documented:

## 2022-08-24 NOTE — ED PROVIDER NOTE - ATTENDING CONTRIBUTION TO CARE
73 y/o M with PMH of urinary retention, DM presents sent from IR after pt presented for scheduled outpatient suprapubic catheter procedure and was found to have elevated glucose. Pt without any complaints, feeling well. IR wants pt's blood glucose to be below 250 prior to the procedure.  VSS in ED. Pt AAO, NAD. labs with glucose 283, no anion gap.  Pt given 2 units insulin and glucose downtrended to 209 (beneath 250ng/dl) which is the cutoff for IR procedure. Pt taken to IR and catheter placed, will d/c to f/u with his urologist.

## 2022-08-25 LAB
CULTURE RESULTS: SIGNIFICANT CHANGE UP
SPECIMEN SOURCE: SIGNIFICANT CHANGE UP

## 2022-08-26 ENCOUNTER — NON-APPOINTMENT (OUTPATIENT)
Age: 72
End: 2022-08-26

## 2022-08-26 LAB — BACTERIA UR CULT: NORMAL

## 2022-08-29 ENCOUNTER — NON-APPOINTMENT (OUTPATIENT)
Age: 72
End: 2022-08-29

## 2022-09-06 ENCOUNTER — APPOINTMENT (OUTPATIENT)
Dept: UROLOGY | Facility: CLINIC | Age: 72
End: 2022-09-06
Payer: MEDICARE

## 2022-09-06 VITALS
SYSTOLIC BLOOD PRESSURE: 154 MMHG | TEMPERATURE: 97.7 F | DIASTOLIC BLOOD PRESSURE: 72 MMHG | HEART RATE: 75 BPM | OXYGEN SATURATION: 96 %

## 2022-09-06 DIAGNOSIS — N18.5 HYPERTENSIVE CHRONIC KIDNEY DISEASE WITH STAGE 5 CHRONIC KIDNEY DISEASE OR END STAGE RENAL DISEASE: ICD-10-CM

## 2022-09-06 DIAGNOSIS — I12.0 HYPERTENSIVE CHRONIC KIDNEY DISEASE WITH STAGE 5 CHRONIC KIDNEY DISEASE OR END STAGE RENAL DISEASE: ICD-10-CM

## 2022-09-06 PROCEDURE — A4218: CPT | Mod: NC

## 2022-09-06 PROCEDURE — 51700 IRRIGATION OF BLADDER: CPT

## 2022-09-06 PROCEDURE — 99215 OFFICE O/P EST HI 40 MIN: CPT | Mod: 25

## 2022-09-06 NOTE — ASSESSMENT
[FreeTextEntry1] : I discussed the findings and options with Mr. and . AURA LOPEZ in detail.  The suprapubic drainage bag was completely twisted which was the likely cause for the urinary leakage.  The bladder was irrigated with sterile normal saline obtaining a clear output, and the bag reconnected and positioned correctly.  The patient and his wife were instructed on its proper use.\par \par Mr. Lopez should discontinue the triple therapy he had been on for his voiding symptoms.\par \par I would like to see him, electively, in 2 to 3 weeks.  At that time a larger suprapubic catheter may be considered.   A decision will also need to be made regarding subsequent treatment options.  In the meantime he will need to get his blood sugar under better control.  A serum creatinine should be repeated on his next visit.  H\par \par \par

## 2022-09-06 NOTE — HISTORY OF PRESENT ILLNESS
[FreeTextEntry1] : Mr. AURA DEL VALLE comes in today for urologic followup after seeing Dr. Teja Hogan urgently on August 23. Due to worsening urinary retention and azotemia, Mr. Del Valle had a suprapubic tube placed by Interventional Radiology on August 25, 2022. The tube became blocked the next day, prompting a visit to his local Emergency Room.  He now presents with urine leaking everywhere and no drainage in the collection bag.\par \par Prior to this, Mr. Del Valle had  longstanding obstructive and irritative voiding symptoms. He also had urge incontinence for which he wore 2 pads/day, which were soiled.  He denies gross hematuria or dysuria. \par \par Mr. Del Valle was diagnosed with a urethral stricture for which he underwent two office dilations followed by an in-hospital dilation and direct visual internal laser urethrotomy (DVIU) in July 2020 (Dr. Gardner at [facility]). The procedure was unsuccessful in alleviating his symptoms and a Rodriguez was placed emergently in September 2020.  Then, in October he had a percutaneous cystostomy tube placed (in the OR by Dr. Owens using cystoscopic guidance).  Mr. Del Valle had another urethral office dilation in December 2020.  His voiding symptoms did not improve. He had been on Flomax 0.4mg and Proscar for several years with Cialis 5 mg daily added more recently.  A RUG in May 2021 confirmed an extensive urethral stricture sparing only the proximal bulbar segment. \par \par Mr. Del Valle has ED, but is not sexually active. \par \par PSA:  5/26/22--0.27; 5/11/21--0.71\par \par Creat:  8/24/22--2.25; 5/11/21--1.61; 2/17/21--2.07

## 2022-09-06 NOTE — PHYSICAL EXAM
[General Appearance - Well Developed] : well developed [General Appearance - Well Nourished] : well nourished [Normal Appearance] : normal appearance [Well Groomed] : well groomed [General Appearance - In No Acute Distress] : no acute distress [Abdomen Soft] : soft [Abdomen Tenderness] : non-tender [Abdomen Mass (___ Cm)] : no abdominal mass palpated [Costovertebral Angle Tenderness] : no ~M costovertebral angle tenderness [Urethral Meatus] : meatus normal [Penis Abnormality] : normal circumcised penis [Urinary Bladder Findings] : the bladder was normal on palpation [Scrotum] : the scrotum was normal [Testes Tenderness] : no tenderness of the testes [Testes Mass (___cm)] : there were no testicular masses [Prostate Tenderness] : the prostate was not tender [No Prostate Nodules] : no prostate nodules [Heart Rate And Rhythm] : Heart rate and rhythm were normal [Edema] : no peripheral edema [] : no respiratory distress [Respiration, Rhythm And Depth] : normal respiratory rhythm and effort [Exaggerated Use Of Accessory Muscles For Inspiration] : no accessory muscle use [Oriented To Time, Place, And Person] : oriented to person, place, and time [Affect] : the affect was normal [Mood] : the mood was normal [Not Anxious] : not anxious [Normal Station and Gait] : the gait and station were normal for the patient's age [No Focal Deficits] : no focal deficits [No Palpable Adenopathy] : no palpable adenopathy [FreeTextEntry1] : Uses a cane to ambulate

## 2022-09-06 NOTE — ADDENDUM
[FreeTextEntry1] : A portion of this note was written by [Shane Webb] on 09/01/2022 acting as a scribe for Dr. Barnhart. \par \par I have personally reviewed the chart and agree that the record accurately reflects my personal performance of the history, physical exam, assessment, and plan.

## 2022-09-08 LAB — BACTERIA UR CULT: NORMAL

## 2022-09-10 ENCOUNTER — NON-APPOINTMENT (OUTPATIENT)
Age: 72
End: 2022-09-10

## 2022-09-11 ENCOUNTER — NON-APPOINTMENT (OUTPATIENT)
Age: 72
End: 2022-09-11

## 2022-09-12 ENCOUNTER — APPOINTMENT (OUTPATIENT)
Dept: UROLOGY | Facility: CLINIC | Age: 72
End: 2022-09-12
Payer: MEDICARE

## 2022-09-12 PROCEDURE — 76857 US EXAM PELVIC LIMITED: CPT

## 2022-09-12 PROCEDURE — 99215 OFFICE O/P EST HI 40 MIN: CPT

## 2022-09-12 RX ORDER — SULFAMETHOXAZOLE AND TRIMETHOPRIM 800; 160 MG/1; MG/1
800-160 TABLET ORAL TWICE DAILY
Qty: 10 | Refills: 0 | Status: DISCONTINUED | COMMUNITY
Start: 2021-05-13 | End: 2022-09-12

## 2022-09-12 RX ORDER — SULFAMETHOXAZOLE AND TRIMETHOPRIM 800; 160 MG/1; MG/1
800-160 TABLET ORAL TWICE DAILY
Qty: 10 | Refills: 0 | Status: DISCONTINUED | COMMUNITY
Start: 2021-12-02 | End: 2022-09-12

## 2022-09-12 RX ORDER — CIPROFLOXACIN HYDROCHLORIDE 500 MG/1
500 TABLET, FILM COATED ORAL
Qty: 10 | Refills: 0 | Status: DISCONTINUED | COMMUNITY
Start: 2022-08-23 | End: 2022-09-12

## 2022-09-12 NOTE — ASSESSMENT
[FreeTextEntry1] : I discussed the findings and options with Mr. and . AURA LOPEZ in detail.  The suprapubic drainage bag set up was changed in the insertion site cleansed with Betadine.  The patient and his wife were again instructed on its proper use.\par \par I would like to see him, electively, in 2 to 3 weeks.  At that time a larger suprapubic catheter may be considered.   A decision will also need to be made regarding subsequent treatment options.  In the meantime he will need to get his blood sugar under better control. \par \par  A serum creatinine is pending.\par \par

## 2022-09-12 NOTE — HISTORY OF PRESENT ILLNESS
[FreeTextEntry1] : Mr. AURA DEL VALLE comes in today for an emergent visit because his suprapubic catheter has been leaking. Mr. Del Valle had a suprapubic tube placed by Interventional Radiology on August 25, 2022 because of worsening urinary retention and azotemia. Previously the tube became blocked prompting a visit to his local Emergency Room. \par \par Mr. Del Valle has a  longstanding history of obstructive and irritative voiding symptoms. He also had urge incontinence for which he wore 2 pads/day, which were soiled.  He denies gross hematuria or dysuria. \par \par Mr. Del Valle was diagnosed with a urethral stricture for which he underwent two office dilations followed by an in-hospital dilation and direct visual internal laser urethrotomy (DVIU) in July 2020 (Dr. Gardner at [facility]). The procedure was unsuccessful in alleviating his symptoms and a Rodriguez was placed emergently in September 2020.  Then, in October he had a percutaneous cystostomy tube placed (in the OR by Dr. Owens using cystoscopic guidance).  Mr. Del Valle had another urethral office dilation in December 2020.  His voiding symptoms did not improve. He had been on Flomax 0.4mg and Proscar for several years with Cialis 5 mg daily added more recently, but was advised to discontinue these.  A RUG in May 2021 confirmed an extensive urethral stricture sparing only the proximal bulbar segment. \par \par Mr. Del Valle has ED, but is not sexually active. \par \par PSA:  5/26/22--0.27; 5/11/21--0.71\par \par Creat:  8/24/22--2.25; 5/11/21--1.61; 2/17/21--2.07

## 2022-09-12 NOTE — ADDENDUM
[FreeTextEntry1] : A portion of this note was written by [Shane Webb] on 09/12/2022 acting as a scribe for Dr. Barnhart. \par \par I have personally reviewed the chart and agree that the record accurately reflects my personal performance of the history, physical exam, assessment, and plan.

## 2022-09-13 LAB
CREAT SERPL-MCNC: 1.87 MG/DL
EGFR: 38 ML/MIN/1.73M2

## 2022-09-14 ENCOUNTER — NON-APPOINTMENT (OUTPATIENT)
Age: 72
End: 2022-09-14

## 2022-09-14 LAB — BACTERIA UR CULT: NORMAL

## 2022-09-15 ENCOUNTER — NON-APPOINTMENT (OUTPATIENT)
Age: 72
End: 2022-09-15

## 2022-09-19 ENCOUNTER — APPOINTMENT (OUTPATIENT)
Dept: UROLOGY | Facility: CLINIC | Age: 72
End: 2022-09-19
Payer: MEDICARE

## 2022-09-20 ENCOUNTER — APPOINTMENT (OUTPATIENT)
Dept: UROLOGY | Facility: CLINIC | Age: 72
End: 2022-09-20
Payer: MEDICARE

## 2022-09-20 PROCEDURE — 99215 OFFICE O/P EST HI 40 MIN: CPT

## 2022-09-20 NOTE — ASSESSMENT
[FreeTextEntry1] : I discussed the options for the management of his panurethral stricture with Mr. and . AURA LOPEZ, including:\par 1) "Do nothing" and remain with an indwelling suprapubic catheter indefinitely (with monthly changes)\par 2)  Augmented urethroplasty with sequential buccal mucosal grafts \par 3) Perineal urethrostomy\par \par The risks including, infection, bleeding, restenosis, sexual complications, penile shortening, chordee or torsion, graft/flap necrosis, oral complications, urethrocutaneous fistula, positional and anesthesia complications, and benefits were discussed in detail. They would like to consider their options and we will further discuss this at their next visit. \par \par I again reviewed the management of an indwelling suprapubic catheter, including meticulous hygiene and maintaining the drainage bag at the upper thigh.\par \par Importantly, Mr. Lopez should try to lose weight and manage his blood glucose as this will undoubtedly improve his urologic condition. \par \par Providing there are no new problems, I look forward to seeing Mr. Lopez in two weeks. At that time we will change his suprapubic tube.

## 2022-09-20 NOTE — HISTORY OF PRESENT ILLNESS
[FreeTextEntry1] : Mr. AURA DEL VALLE comes in today for follow-up. He had a suprapubic tube placed by Interventional Radiology on August 25, 2022 because of worsening urinary retention and azotemia.  He has had problems with this tube because of intermittent obstruction prompting visits to the emergency room in our office.  Currently the tube seems to be draining adequately but it is positional.\par \par Mr. Del Valle has a  longstanding history of obstructive and irritative voiding symptoms. He also had urge incontinence for which he wore 2 pads/day, which were soiled.  He denies gross hematuria or dysuria. \par \par Mr. Del Valle was diagnosed with a urethral stricture for which he underwent two office dilations followed by an in-hospital dilation and direct visual internal laser urethrotomy (DVIU) in July 2020 (Dr. Gardner at [facility]). The procedure was unsuccessful in alleviating his symptoms and a Rodriguez was placed emergently in September 2020.  Then, in October he had a percutaneous cystostomy tube placed (in the OR by Dr. Owens using cystoscopic guidance).  Mr. Del Valle had another urethral office dilation in December 2020.  His voiding symptoms did not improve. He had been on Flomax 0.4mg and Proscar for several years with Cialis 5 mg daily added more recently, but was advised to discontinue these.  A RUG in May 2021 confirmed an extensive urethral stricture sparing only the proximal bulbar segment. \par \par Mr. Del Valle has ED, but is not sexually active. \par \par PSA:  5/26/22--0.27; 5/11/21--0.71\par \par Creat:  8/24/22--2.25; 5/11/21--1.61; 2/17/21--2.07

## 2022-09-20 NOTE — ADDENDUM
[FreeTextEntry1] : A portion of this note was written by [Shane Webb] on 09/15/2022 acting as a scribe for Dr. Barnhart. \par \par I have personally reviewed the chart and agree that the record accurately reflects my personal performance of the history, physical exam, assessment, and plan.

## 2022-09-22 ENCOUNTER — APPOINTMENT (OUTPATIENT)
Dept: ENDOCRINOLOGY | Facility: CLINIC | Age: 72
End: 2022-09-22

## 2022-10-06 ENCOUNTER — APPOINTMENT (OUTPATIENT)
Dept: UROLOGY | Facility: CLINIC | Age: 72
End: 2022-10-06

## 2022-10-06 VITALS
HEIGHT: 66 IN | HEART RATE: 68 BPM | WEIGHT: 260 LBS | SYSTOLIC BLOOD PRESSURE: 136 MMHG | BODY MASS INDEX: 41.78 KG/M2 | DIASTOLIC BLOOD PRESSURE: 71 MMHG | TEMPERATURE: 97.8 F

## 2022-10-06 PROCEDURE — 99212 OFFICE O/P EST SF 10 MIN: CPT | Mod: 25

## 2022-10-06 PROCEDURE — 51705 CHANGE OF BLADDER TUBE: CPT

## 2022-10-06 NOTE — ADDENDUM
[FreeTextEntry1] : A portion of this note was written by [Shane Webb] on 10/06/2022 acting as a scribe for Dr. Barnhart. \par \par I have personally reviewed the chart and agree that the record accurately reflects my personal performance of the history, physical exam, assessment, and plan.

## 2022-10-06 NOTE — HISTORY OF PRESENT ILLNESS
[FreeTextEntry1] : Mr. AURA DEL VALLE comes in today for follow-up, including a suprapubic tube change. The tube was placed by Interventional Radiology on August 25, 2022 because of worsening urinary retention and azotemia.  He has had problems with this tube because of intermittent obstruction prompting visits to the emergency room in our office.  Currently the tube seems to be draining adequately but it is positional.\par \par Mr. Del Valle has a  longstanding history of obstructive and irritative voiding symptoms. He also had urge incontinence for which he wore 2 pads/day, which were soiled.  He denies gross hematuria or dysuria. \par \par Mr. Del Valle was diagnosed with a urethral stricture for which he underwent two office dilations followed by an in-hospital dilation and direct visual internal laser urethrotomy (DVIU) in July 2020 (Dr. Gardner at [facility]). The procedure was unsuccessful in alleviating his symptoms and a Rodriguez was placed emergently in September 2020.  Then, in October he had a percutaneous cystostomy tube placed (in the OR by Dr. Ownes using cystoscopic guidance).  Mr. Del Valle had another urethral office dilation in December 2020.  His voiding symptoms did not improve. He had been on Flomax 0.4mg and Proscar for several years with Cialis 5 mg daily added more recently, but was advised to discontinue these.  A RUG in May 2021 confirmed an extensive urethral stricture sparing only the proximal bulbar segment. \par \par Mr. Del Valle has ED, but is not sexually active. \par \par PSA:  5/26/22--0.27; 5/11/21--0.71\par \par Creat:  8/24/22--2.25; 5/11/21--1.61; 2/17/21--2.07

## 2022-10-06 NOTE — PHYSICAL EXAM
[General Appearance - Well Developed] : well developed [General Appearance - Well Nourished] : well nourished [Normal Appearance] : normal appearance [Well Groomed] : well groomed [General Appearance - In No Acute Distress] : no acute distress [Abdomen Soft] : soft [Abdomen Tenderness] : non-tender [Abdomen Mass (___ Cm)] : no abdominal mass palpated [Costovertebral Angle Tenderness] : no ~M costovertebral angle tenderness [Urethral Meatus] : meatus normal [Penis Abnormality] : normal circumcised penis [Urinary Bladder Findings] : the bladder was normal on palpation [Scrotum] : the scrotum was normal [Testes Tenderness] : no tenderness of the testes [Testes Mass (___cm)] : there were no testicular masses [Heart Rate And Rhythm] : Heart rate and rhythm were normal [Edema] : no peripheral edema [] : no respiratory distress [Respiration, Rhythm And Depth] : normal respiratory rhythm and effort [Exaggerated Use Of Accessory Muscles For Inspiration] : no accessory muscle use [Oriented To Time, Place, And Person] : oriented to person, place, and time [Affect] : the affect was normal [Mood] : the mood was normal [Not Anxious] : not anxious [Normal Station and Gait] : the gait and station were normal for the patient's age [No Focal Deficits] : no focal deficits [No Palpable Adenopathy] : no palpable adenopathy [FreeTextEntry1] : Uses a cane to ambulate

## 2022-10-06 NOTE — ASSESSMENT
[FreeTextEntry1] : The suprapubic tube was changed using a 16Fr Rodriguez, which was connected to a leg bag. \par \par In forward,  and Mrs. Del Valle understand the options for the management of his panurethral stricture, including:\par 1) "Do nothing" and remain with an indwelling suprapubic catheter indefinitely (with monthly changes)\par 2)  Augmented urethroplasty with sequential buccal mucosal grafts \par 3) Perineal urethrostomy\par \par The risks including, infection, bleeding, restenosis, sexual complications, penile shortening, chordee or torsion, graft/flap necrosis, oral complications, urethrocutaneous fistula, positional and anesthesia complications, and benefits were discussed in detail. They would like to consider their options and we will further discuss this at their next visit. \par \par Importantly, Mr. Del Valle should try to lose weight and manage his blood glucose as this will undoubtedly improve his urologic condition. \par \par Providing there are no new problems, I look forward to seeing Mr. Del Valle in 4 to 6 weeks for the next suprapubic tube change.

## 2022-10-10 LAB — BACTERIA UR CULT: ABNORMAL

## 2022-11-17 ENCOUNTER — APPOINTMENT (OUTPATIENT)
Dept: UROLOGY | Facility: CLINIC | Age: 72
End: 2022-11-17
Payer: MEDICARE

## 2022-11-17 PROCEDURE — 99213 OFFICE O/P EST LOW 20 MIN: CPT | Mod: 25

## 2022-11-17 PROCEDURE — 51705 CHANGE OF BLADDER TUBE: CPT

## 2022-11-17 NOTE — ASSESSMENT
[FreeTextEntry1] : I discussed the findings and options with MrAriana and . AURA DEL VALLE in detail.\par The suprapubic tube was changed using a 18Fr Rodriguez, which was connected to a makeshift extension tubing and leg bag.  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 \par \par Importantly, Mr. Del Valle should try to lose weight and manage his blood glucose as this will undoubtedly improve his urologic condition. \par \par Providing there are no new problems, I look forward to seeing Mr. Del Valle in 4 to 6 weeks for the next suprapubic tube change.

## 2022-11-17 NOTE — ADDENDUM
[FreeTextEntry1] : A portion of this note was written by [Shane Webb] on 11/15/2022 acting as a scribe for Dr. Barnhart. \par \par I have personally reviewed the chart and agree that the record accurately reflects my personal performance of the history, physical exam, assessment, and plan.

## 2023-01-12 ENCOUNTER — APPOINTMENT (OUTPATIENT)
Dept: UROLOGY | Facility: CLINIC | Age: 73
End: 2023-01-12
Payer: MEDICARE

## 2023-01-12 VITALS
SYSTOLIC BLOOD PRESSURE: 116 MMHG | HEART RATE: 76 BPM | TEMPERATURE: 97.6 F | OXYGEN SATURATION: 92 % | DIASTOLIC BLOOD PRESSURE: 70 MMHG

## 2023-01-12 PROCEDURE — 51705 CHANGE OF BLADDER TUBE: CPT

## 2023-01-12 NOTE — ASSESSMENT
[FreeTextEntry1] : I discussed the findings and options with Mr. and . AURA DEL VALLE in detail.\par \par The suprapubic tube was changed using a 18Fr Rodriguez, which was connected to a leg bag.  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 Del Valle should try to lose weight and manage his blood glucose as this will undoubtedly improve his urologic condition. \par \par Providing there are no new problems, I look forward to seeing Mr. Del Valle in 4 to 6 weeks for the next suprapubic tube change as he does not want to pursue surgical options at this time.

## 2023-01-12 NOTE — HISTORY OF PRESENT ILLNESS
[FreeTextEntry1] : Mr. AURA DEL VALLE comes in today for follow-up, including a suprapubic tube change. The tube was placed by Interventional Radiology on August 25, 2022 because of worsening urinary retention and azotemia.  He has had multiple problems with the suprapubic tubes because of intermittent obstruction prompting visits to the emergency room and our office.  Currently the tube seems to be draining adequately but it is positional.\par \par Mr. Del Valle has a  longstanding history of obstructive and irritative voiding symptoms. He also had urge incontinence for which he wore 2 pads/day, which were soiled.  He denies gross hematuria or dysuria. \par \par Mr. Del Valle was diagnosed with a urethral stricture for which he underwent two office dilations followed by an in-hospital dilation and direct visual internal laser urethrotomy (DVIU) in July 2020 (Dr. Gardner at [facility]). The procedure was unsuccessful in alleviating his symptoms and a Rodriguez was placed emergently in September 2020.  Then, in October he had a percutaneous cystostomy tube placed (in the OR by Dr. Owens using cystoscopic guidance).  Mr. Del Valle had another urethral office dilation in December 2020.  His voiding symptoms did not improve. He had been on Flomax 0.4mg and Proscar for several years with Cialis 5 mg daily added more recently, but was advised to discontinue these.  A RUG in May 2021 confirmed an extensive urethral stricture sparing only the proximal bulbar segment. \par Mr. Del Valle has ED, but is not sexually active. \par \par PSA:  5/26/22--0.27; 5/11/21--0.71\par \par Creat:  8/24/22--2.25; 5/11/21--1.61; 2/17/21--2.07

## 2023-01-12 NOTE — ADDENDUM
[FreeTextEntry1] : A portion of this note was written by [Shane Webb] on 01/11/2023 acting as a scribe for Dr. Barnhart. \par \par I have personally reviewed the chart and agree that the record accurately reflects my personal performance of the history, physical exam, assessment, and plan.

## 2023-02-21 ENCOUNTER — APPOINTMENT (OUTPATIENT)
Dept: UROLOGY | Facility: CLINIC | Age: 73
End: 2023-02-21
Payer: MEDICARE

## 2023-02-21 VITALS
SYSTOLIC BLOOD PRESSURE: 113 MMHG | OXYGEN SATURATION: 96 % | TEMPERATURE: 97.3 F | HEART RATE: 69 BPM | DIASTOLIC BLOOD PRESSURE: 72 MMHG

## 2023-02-21 PROCEDURE — 51705 CHANGE OF BLADDER TUBE: CPT

## 2023-02-21 NOTE — HISTORY OF PRESENT ILLNESS
[FreeTextEntry1] : Mr. AURA DEL VALLE comes in today for follow-up, including a suprapubic tube change. The tube was placed by Interventional Radiology on August 25, 2022 because of worsening urinary retention and azotemia. In the past, he has had multiple problems with the suprapubic tubes because of intermittent obstruction prompting visits to the emergency room and our office.  Currently the tube seems to be draining adequately without any recent problems.\par \par Mr. Del Valle has a  longstanding history of obstructive and irritative voiding symptoms. He also had urge incontinence for which he wore 2 pads/day, which were soiled.  He denies gross hematuria or dysuria. \par \par Mr. Del Valle was diagnosed with a urethral stricture for which he underwent two office dilations followed by an in-hospital dilation and direct visual internal laser urethrotomy (DVIU) in July 2020 (Dr. Gardner at [facility]). The procedure was unsuccessful in alleviating his symptoms and a Rodriguez was placed emergently in September 2020.  Then, in October he had a percutaneous cystostomy tube placed (in the OR by Dr. Owens using cystoscopic guidance).  Mr. Del Valle had another urethral office dilation in December 2020.  His voiding symptoms did not improve. He had been on Flomax 0.4mg and Proscar for several years with Cialis 5 mg daily added more recently, but was advised to discontinue these.  A RUG in May 2021 confirmed an extensive urethral stricture sparing only the proximal bulbar segment. \par Mr. Del Valle has ED, but is not sexually active. \par \par PSA:  5/26/22--0.27; 5/11/21--0.71\par \par Creat:  8/24/22--2.25; 5/11/21--1.61; 2/17/21--2.07

## 2023-02-21 NOTE — ADDENDUM
[FreeTextEntry1] : A portion of this note was written by [Shane Webb] on 02/17/2023 acting as a scribe for Dr. Barnhart. \par \par I have personally reviewed the chart and agree that the record accurately reflects my personal performance of the history, physical exam, assessment, and plan.

## 2023-03-16 ENCOUNTER — NON-APPOINTMENT (OUTPATIENT)
Age: 73
End: 2023-03-16

## 2023-03-20 ENCOUNTER — NON-APPOINTMENT (OUTPATIENT)
Age: 73
End: 2023-03-20

## 2023-03-20 DIAGNOSIS — N32.89 OTHER SPECIFIED DISORDERS OF BLADDER: ICD-10-CM

## 2023-03-20 RX ORDER — OXYBUTYNIN CHLORIDE 5 MG/1
5 TABLET ORAL
Qty: 10 | Refills: 0 | Status: ACTIVE | COMMUNITY
Start: 2023-03-20 | End: 1900-01-01

## 2023-03-30 ENCOUNTER — APPOINTMENT (OUTPATIENT)
Dept: UROLOGY | Facility: CLINIC | Age: 73
End: 2023-03-30
Payer: MEDICARE

## 2023-03-30 VITALS
TEMPERATURE: 97.3 F | DIASTOLIC BLOOD PRESSURE: 67 MMHG | HEART RATE: 81 BPM | OXYGEN SATURATION: 94 % | SYSTOLIC BLOOD PRESSURE: 117 MMHG

## 2023-03-30 PROCEDURE — 51705 CHANGE OF BLADDER TUBE: CPT

## 2023-03-30 NOTE — HISTORY OF PRESENT ILLNESS
[FreeTextEntry1] : Mr. AURA DEL VALLE comes in today for follow-up, including a suprapubic tube change. The tube was placed by Interventional Radiology on August 25, 2022 because of worsening urinary retention and azotemia. In the past, he has had multiple problems with the suprapubic tubes because of intermittent obstruction prompting visits to the emergency room and our office.  Currently the tube seems to be draining adequately without any recent problems.\par \par Mr. Del Valle has a  longstanding history of obstructive and irritative voiding symptoms. He also had urge incontinence for which he wore 2 pads/day, which were soiled.  He denies gross hematuria or dysuria. \par \par Mr. Del Valle was diagnosed with a urethral stricture for which he underwent two office dilations followed by an in-hospital dilation and direct visual internal laser urethrotomy (DVIU) in July 2020 (Dr. Gardner at [facility]). The procedure was unsuccessful in alleviating his symptoms and a Rodriguez was placed emergently in September 2020.  Then, in October he had a percutaneous cystostomy tube placed (in the OR by Dr. Owens using cystoscopic guidance).  Mr. Del Valle had another urethral office dilation in December 2020.  His voiding symptoms did not improve. He had been on Flomax 0.4mg and Proscar for several years with Cialis 5 mg daily added more recently, but was advised to discontinue these.  A RUG in May 2021 confirmed an extensive urethral stricture sparing only the proximal bulbar segment. \par \par Mr. Del Valle has ED, but is not sexually active. \par \par PSA:  5/26/22--0.27; 5/11/21--0.71\par \par Creat:  8/24/22--2.25; 5/11/21--1.61; 2/17/21--2.07

## 2023-03-30 NOTE — PHYSICAL EXAM
[General Appearance - Well Developed] : well developed [General Appearance - Well Nourished] : well nourished [Normal Appearance] : normal appearance [Well Groomed] : well groomed [General Appearance - In No Acute Distress] : no acute distress [Abdomen Soft] : soft [Abdomen Tenderness] : non-tender [Abdomen Mass (___ Cm)] : no abdominal mass palpated [Costovertebral Angle Tenderness] : no ~M costovertebral angle tenderness [Urethral Meatus] : meatus normal [Penis Abnormality] : normal circumcised penis [Urinary Bladder Findings] : the bladder was normal on palpation [Scrotum] : the scrotum was normal [Testes Tenderness] : no tenderness of the testes [Testes Mass (___cm)] : there were no testicular masses [Heart Rate And Rhythm] : Heart rate and rhythm were normal [Edema] : no peripheral edema [] : no respiratory distress [Respiration, Rhythm And Depth] : normal respiratory rhythm and effort [Exaggerated Use Of Accessory Muscles For Inspiration] : no accessory muscle use [Affect] : the affect was normal [Oriented To Time, Place, And Person] : oriented to person, place, and time [Mood] : the mood was normal [Not Anxious] : not anxious [Normal Station and Gait] : the gait and station were normal for the patient's age [No Focal Deficits] : no focal deficits [No Palpable Adenopathy] : no palpable adenopathy [FreeTextEntry1] : Uses a cane to ambulate

## 2023-03-30 NOTE — ADDENDUM
[FreeTextEntry1] : A portion of this note was written by [Shnae Webb] on 03/29/2023 acting as a scribe for Dr. Barnhart. \par \par I have personally reviewed the chart and agree that the record accurately reflects my personal performance of the history, physical exam, assessment, and plan.

## 2023-03-30 NOTE — ASSESSMENT
[FreeTextEntry1] : I discussed the findings and options with Mr. and . AURA DEL VALLE in detail.\par \par The suprapubic tube was changed using a 18Fr Rodriguez, which was connected to a leg bag.  \par \par I stressed the importance of meticulous hygiene.\par \par The management options for the panurethral stricture have been again outlined, including:\par 1) "Do nothing" and remain with an indwelling suprapubic catheter indefinitely (with monthly changes)\par 2)  Augmented urethroplasty with sequential buccal mucosal grafts \par 3) Perineal urethrostomy\par \par Importantly, Mr. Del Valle should try to lose weight and manage his blood glucose as this will undoubtedly improve his urologic condition. \par \par Providing there are no new problems, I look forward to seeing Mr. Del Valle in 4 to 6 weeks for the next suprapubic tube change as he does not want to pursue surgical options at this time.

## 2023-05-04 ENCOUNTER — APPOINTMENT (OUTPATIENT)
Dept: UROLOGY | Facility: CLINIC | Age: 73
End: 2023-05-04
Payer: MEDICARE

## 2023-05-04 PROCEDURE — 51705 CHANGE OF BLADDER TUBE: CPT

## 2023-05-04 NOTE — ASSESSMENT
[FreeTextEntry1] : I discussed the findings and options with Mr. and . AURA DEL VALLE in detail.\par \par The suprapubic tube was changed using a 18Fr Rodriguez, which was connected to a leg bag.  \par \par I stressed the importance of meticulous hygiene. He should keep the SP site protected with triple paste.\par \par The management options for the panurethral stricture have been again outlined, including:\par 1) "Do nothing" and remain with an indwelling suprapubic catheter indefinitely (with monthly changes)\par 2)  Augmented urethroplasty with sequential buccal mucosal grafts \par 3) Perineal urethrostomy\par \par Importantly, Mr. Del Valle should try to lose weight and manage his blood glucose as this will undoubtedly improve his urologic condition. \par \par Providing there are no new problems, I look forward to seeing Mr. Del Valle in 4 to 6 weeks for the next suprapubic tube change as he does not want to pursue surgical options at this time. We will try and have a visiting nurse help him at home.

## 2023-05-04 NOTE — HISTORY OF PRESENT ILLNESS
[FreeTextEntry1] : Mr. AURA DEL VALLE comes in today for follow-up, including a suprapubic tube change. The tube was placed by Interventional Radiology on August 25, 2022 because of worsening urinary retention and azotemia. In the past, he has had multiple problems with the suprapubic tubes because of intermittent obstruction prompting visits to the emergency room and our office.  Currently the tube seems to be draining adequately without any recent problems, although he continues to have problems with the bags "breaking".\par \par Mr. Del Valle has a  longstanding history of obstructive and irritative voiding symptoms. He also had urge incontinence for which he wore 2 pads/day, which were soiled.  He denies gross hematuria or dysuria. \par \par Mr. Del Valle was diagnosed with a urethral stricture for which he underwent two office dilations followed by an in-hospital dilation and direct visual internal laser urethrotomy (DVIU) in July 2020 (Dr. Gardner at [facility]). The procedure was unsuccessful in alleviating his symptoms and a Rodriguez was placed emergently in September 2020.  Then, in October he had a percutaneous cystostomy tube placed (in the OR by Dr. Owens using cystoscopic guidance).  Mr. Del Valle had another urethral office dilation in December 2020.  His voiding symptoms did not improve. He had been on Flomax 0.4mg and Proscar for several years with Cialis 5 mg daily added more recently, but was advised to discontinue these.  A RUG in May 2021 confirmed an extensive urethral stricture sparing only the proximal bulbar segment. \par \par Mr. Del Valle has ED, but is not sexually active. \par \par PSA:  5/26/22--0.27; 5/11/21--0.71\par \par Creat:  9/12/22--1.87; 8/24/22--2.25; 5/11/21--1.61; 2/17/21--2.07

## 2023-05-05 ENCOUNTER — NON-APPOINTMENT (OUTPATIENT)
Age: 73
End: 2023-05-05

## 2023-05-05 LAB — PSA SERPL-MCNC: 1.19 NG/ML

## 2023-05-15 ENCOUNTER — NON-APPOINTMENT (OUTPATIENT)
Age: 73
End: 2023-05-15

## 2023-05-30 ENCOUNTER — NON-APPOINTMENT (OUTPATIENT)
Age: 73
End: 2023-05-30

## 2023-06-08 ENCOUNTER — APPOINTMENT (OUTPATIENT)
Dept: UROLOGY | Facility: CLINIC | Age: 73
End: 2023-06-08
Payer: MEDICARE

## 2023-06-08 PROCEDURE — 51705 CHANGE OF BLADDER TUBE: CPT

## 2023-06-08 NOTE — HISTORY OF PRESENT ILLNESS
[FreeTextEntry1] : Mr. AURA DEL VALLE comes in today for follow-up, including his 5-6 week regular suprapubic tube change. The tube was placed by Interventional Radiology on August 25, 2022 because of worsening urinary retention and azotemia. In the past, he has had multiple problems with the suprapubic tubes because of intermittent obstruction and the bags breaking, prompting visits to the emergency room and our office.  Recently these issues have not recurred.\par \par Mr. Del Valle has a  longstanding history of obstructive and irritative voiding symptoms. He also had urge incontinence for which he wore 2 pads/day, which were soiled.  He denies gross hematuria or dysuria. \par \par Mr. Del Valle was diagnosed with a urethral stricture for which he underwent two office dilations followed by an in-hospital dilation and direct visual internal laser urethrotomy (DVIU) in July 2020 (Dr. Gardner at [facility]). The procedure was unsuccessful in alleviating his symptoms and a Rodriguez was placed emergently in September 2020.  Then, in October he had a percutaneous cystostomy tube placed (in the OR by Dr. Owens using cystoscopic guidance).  Mr. Del Valle had another urethral office dilation in December 2020.  His voiding symptoms did not improve. He had been on Flomax 0.4mg and Proscar for several years with Cialis 5 mg daily added more recently, but was advised to discontinue these.  A RUG in May 2021 confirmed an extensive urethral stricture sparing only the proximal bulbar segment. \par \par Mr. Del Valle has ED, but is not sexually active. \par \par PSA:  5/5/23--1.19; 5/26/22--0.27; 5/11/21--0.71\par \par Creat:  9/12/22--1.87; 8/24/22--2.25; 5/11/21--1.61; 2/17/21--2.07

## 2023-06-08 NOTE — ASSESSMENT
[FreeTextEntry1] : I discussed the findings and options with Mr. and . AURA DEL VALLE in detail.\par \par The suprapubic tube was changed using a 18Fr Rodriguez, which was connected to a leg bag.  \par \par I again stressed the importance of meticulous hygiene.\par \par The management options for the panurethral stricture have been again outlined, including:\par 1) "Do nothing" and remain with an indwelling suprapubic catheter indefinitely (with monthly changes)\par 2)  Augmented urethroplasty with sequential buccal mucosal grafts \par 3) Perineal urethrostomy\par \par Importantly, Mr. Del Valle should try to lose weight and manage his blood glucose as this will undoubtedly improve his overall health. \par \par Providing there are no new problems, I look forward to seeing Mr. Del Valle in 4 to 6 weeks for the next suprapubic tube change as he does not want to pursue surgical options at this time.  Unfortunately, VNS is not an option due to insurance limitations.

## 2023-07-12 ENCOUNTER — APPOINTMENT (OUTPATIENT)
Dept: UROLOGY | Facility: CLINIC | Age: 73
End: 2023-07-12
Payer: MEDICARE

## 2023-07-12 PROCEDURE — 51705 CHANGE OF BLADDER TUBE: CPT

## 2023-07-12 NOTE — PHYSICAL EXAM
[General Appearance - Well Developed] : well developed [General Appearance - Well Nourished] : well nourished [Normal Appearance] : normal appearance [Well Groomed] : well groomed [General Appearance - In No Acute Distress] : no acute distress [Abdomen Soft] : soft [Abdomen Tenderness] : non-tender [Costovertebral Angle Tenderness] : no ~M costovertebral angle tenderness [Abdomen Mass (___ Cm)] : no abdominal mass palpated [Urethral Meatus] : meatus normal [Penis Abnormality] : normal circumcised penis [Urinary Bladder Findings] : the bladder was normal on palpation [Scrotum] : the scrotum was normal [Testes Tenderness] : no tenderness of the testes [Testes Mass (___cm)] : there were no testicular masses [Heart Rate And Rhythm] : Heart rate and rhythm were normal [Edema] : no peripheral edema [] : no respiratory distress [Respiration, Rhythm And Depth] : normal respiratory rhythm and effort [Exaggerated Use Of Accessory Muscles For Inspiration] : no accessory muscle use [Oriented To Time, Place, And Person] : oriented to person, place, and time [Affect] : the affect was normal [Mood] : the mood was normal [Not Anxious] : not anxious [Normal Station and Gait] : the gait and station were normal for the patient's age [No Focal Deficits] : no focal deficits [No Palpable Adenopathy] : no palpable adenopathy [FreeTextEntry1] : Uses a cane to ambulate

## 2023-07-12 NOTE — HISTORY OF PRESENT ILLNESS
[FreeTextEntry1] : Mr. AURA DEL VALLE comes in today for follow-up, including his 5-6 week regular suprapubic tube change. The tube was placed by Interventional Radiology on August 25, 2022 because of worsening urinary retention and azotemia. In the past, he has had multiple problems with the suprapubic tubes because of intermittent obstruction and the bags breaking, prompting visits to the emergency room and our office.  Recently these issues have not recurred.\par \par Mr. Del Valle has a  longstanding history of obstructive and irritative voiding symptoms. He also had urge incontinence for which he wore 2 pads/day, which were soiled.  He denies gross hematuria or dysuria. \par \par Mr. Del Valle was diagnosed with a urethral stricture for which he underwent two office dilations followed by an in-hospital dilation and direct visual internal laser urethrotomy (DVIU) in July 2020 (Dr. Gardner at [facility]). The procedure was unsuccessful in alleviating his symptoms and a Rodriguez was placed emergently in September 2020.  Then, in October he had a percutaneous cystostomy tube placed (in the OR by Dr. Owens using cystoscopic guidance).  Mr. Del Valle had another urethral office dilation in December 2020.  His voiding symptoms did not improve. He had been on Flomax 0.4mg and Proscar for several years with Cialis 5 mg daily added more recently, but was advised to discontinue these.  A RUG in May 2021 confirmed an extensive urethral stricture sparing only the proximal bulbar segment. \par \par Mr. Del Valle has ED, but is not sexually active. \par \par PSA:  5/5/23--1.19; 5/26/22--0.27; 5/11/21--0.71\par \par Creat:  9/12/22--1.87; 8/24/22--2.25; 5/11/21--1.61; 2/17/21--2.07

## 2023-08-23 ENCOUNTER — APPOINTMENT (OUTPATIENT)
Dept: UROLOGY | Facility: CLINIC | Age: 73
End: 2023-08-23
Payer: MEDICARE

## 2023-08-23 VITALS
DIASTOLIC BLOOD PRESSURE: 62 MMHG | HEART RATE: 72 BPM | BODY MASS INDEX: 41.78 KG/M2 | WEIGHT: 260 LBS | OXYGEN SATURATION: 98 % | SYSTOLIC BLOOD PRESSURE: 98 MMHG | HEIGHT: 66 IN | TEMPERATURE: 97.4 F

## 2023-08-23 PROCEDURE — 51705 CHANGE OF BLADDER TUBE: CPT

## 2023-09-27 ENCOUNTER — APPOINTMENT (OUTPATIENT)
Dept: UROLOGY | Facility: CLINIC | Age: 73
End: 2023-09-27
Payer: MEDICARE

## 2023-09-27 ENCOUNTER — NON-APPOINTMENT (OUTPATIENT)
Age: 73
End: 2023-09-27

## 2023-09-27 PROCEDURE — 51705 CHANGE OF BLADDER TUBE: CPT

## 2023-10-31 PROBLEM — N39.41 URGE INCONTINENCE: Status: ACTIVE | Noted: 2022-05-26

## 2023-11-01 ENCOUNTER — APPOINTMENT (OUTPATIENT)
Dept: UROLOGY | Facility: CLINIC | Age: 73
End: 2023-11-01

## 2023-11-01 DIAGNOSIS — N39.41 URGE INCONTINENCE: ICD-10-CM

## 2023-11-10 ENCOUNTER — APPOINTMENT (OUTPATIENT)
Dept: UROLOGY | Facility: CLINIC | Age: 73
End: 2023-11-10
Payer: MEDICARE

## 2023-11-10 VITALS
OXYGEN SATURATION: 95 % | DIASTOLIC BLOOD PRESSURE: 58 MMHG | HEART RATE: 78 BPM | TEMPERATURE: 97.6 F | SYSTOLIC BLOOD PRESSURE: 96 MMHG

## 2023-11-10 PROCEDURE — 51705 CHANGE OF BLADDER TUBE: CPT

## 2023-12-20 PROBLEM — R39.9 LOWER URINARY TRACT SYMPTOMS: Status: ACTIVE | Noted: 2021-03-18

## 2023-12-20 PROBLEM — Z12.5 PROSTATE CANCER SCREENING: Status: ACTIVE | Noted: 2023-05-03

## 2023-12-20 PROBLEM — R33.9 RETENTION OF URINE: Status: ACTIVE | Noted: 2021-03-18

## 2023-12-20 PROBLEM — K42.9 UMBILICAL HERNIA: Status: ACTIVE | Noted: 2021-03-18

## 2023-12-20 PROBLEM — N43.3 BILATERAL HYDROCELE: Status: ACTIVE | Noted: 2021-03-18

## 2023-12-20 PROBLEM — N35.914 ANTERIOR URETHRAL STRICTURE: Status: ACTIVE | Noted: 2021-03-18

## 2023-12-20 PROBLEM — N48.83 ACQUIRED BURIED PENIS: Status: ACTIVE | Noted: 2021-11-29

## 2023-12-20 NOTE — HISTORY OF PRESENT ILLNESS
[FreeTextEntry1] : Mr. AURA DEL VALLE comes in today for follow-up, including his 4-6 week regular suprapubic tube change. The tube was initially placed by Interventional Radiology on August 25, 2022 because of worsening urinary retention and azotemia. In the past, he has had multiple problems with the subsequent suprapubic tubes because of intermittent obstruction and the bags breaking, prompting visits to the emergency room and our office.  Recently these issues have not recurred.  Mr. Del Valle has a  longstanding history of obstructive and irritative voiding symptoms. He also had urge incontinence for which he wore 2 pads/day, which were soiled.  He denies gross hematuria or dysuria.   Mr. Del Valle was diagnosed with a urethral stricture for which he underwent two office dilations followed by an in-hospital dilation and direct visual internal laser urethrotomy (DVIU) in July 2020 (Dr. Gardner at [facility]). The procedure was unsuccessful in alleviating his symptoms and a Rodriguez was placed emergently in September 2020.  Then, in October he had a percutaneous cystostomy tube placed (in the OR by Dr. Owens using cystoscopic guidance).  Mr. Del Valle had another urethral office dilation in December 2020.  His voiding symptoms did not improve. He had been on Flomax 0.4mg and Proscar for several years with Cialis 5 mg daily added more recently, but was advised to discontinue these.  A RUG in May 2021 confirmed an extensive urethral stricture sparing only the proximal bulbar segment.   Mr. Del Valle has ED, but is not sexually active.   PSA:  5/5/23--1.19; 5/26/22--0.27; 5/11/21--0.71  Creat:  9/12/22--1.87; 8/24/22--2.25; 5/11/21--1.61; 2/17/21--2.07

## 2023-12-20 NOTE — PHYSICAL EXAM
[General Appearance - Well Developed] : well developed [General Appearance - Well Nourished] : well nourished [Normal Appearance] : normal appearance [Well Groomed] : well groomed [General Appearance - In No Acute Distress] : no acute distress [Abdomen Soft] : soft [Abdomen Tenderness] : non-tender [Abdomen Mass (___ Cm)] : no abdominal mass palpated [Costovertebral Angle Tenderness] : no ~M costovertebral angle tenderness [Urethral Meatus] : meatus normal [Penis Abnormality] : normal circumcised penis [Urinary Bladder Findings] : the bladder was normal on palpation [Scrotum] : the scrotum was normal [Testes Tenderness] : no tenderness of the testes [Testes Mass (___cm)] : there were no testicular masses [Heart Rate And Rhythm] : Heart rate and rhythm were normal [Edema] : no peripheral edema [] : no respiratory distress [Respiration, Rhythm And Depth] : normal respiratory rhythm and effort [Exaggerated Use Of Accessory Muscles For Inspiration] : no accessory muscle use [Oriented To Time, Place, And Person] : oriented to person, place, and time [Affect] : the affect was normal [Mood] : the mood was normal [Not Anxious] : not anxious [Normal Station and Gait] : the gait and station were normal for the patient's age [FreeTextEntry1] : Uses a cane to ambulate [No Focal Deficits] : no focal deficits [No Palpable Adenopathy] : no palpable adenopathy

## 2023-12-20 NOTE — ASSESSMENT
[FreeTextEntry1] : I discussed the findings and options with Mr. and . AURA DEL VALLE in detail.  The suprapubic tube was changed using a 18Fr Rodriguez, which was connected to a leg bag.  I again stressed the importance of meticulous hygiene.  The management options for the panurethral stricture have been again outlined, includin) "Do nothing" and remain with an indwelling suprapubic catheter indefinitely (with monthly changes) 2) Augmented urethroplasty with sequential buccal mucosal grafts 3) Perineal urethrostomy  Importantly, Mr. Del Valle should try to lose weight and manage his blood glucose as this will undoubtedly improve his overall health.  Providing there are no new problems, I look forward to seeing Mr. Del Valle in 4 to 6 weeks for the next suprapubic tube change as he does not want to pursue surgical options at this time. Unfortunately, VNS is not an option due to insurance limitations.

## 2023-12-21 ENCOUNTER — APPOINTMENT (OUTPATIENT)
Dept: UROLOGY | Facility: CLINIC | Age: 73
End: 2023-12-21

## 2023-12-21 DIAGNOSIS — R33.9 RETENTION OF URINE, UNSPECIFIED: ICD-10-CM

## 2023-12-21 DIAGNOSIS — N35.914 UNSPECIFIED ANTERIOR URETHRAL STRICTURE, MALE: ICD-10-CM

## 2023-12-21 DIAGNOSIS — N43.3 HYDROCELE, UNSPECIFIED: ICD-10-CM

## 2023-12-21 DIAGNOSIS — N48.83 ACQUIRED BURIED PENIS: ICD-10-CM

## 2023-12-21 DIAGNOSIS — R39.9 UNSPECIFIED SYMPTOMS AND SIGNS INVOLVING THE GENITOURINARY SYSTEM: ICD-10-CM

## 2023-12-21 DIAGNOSIS — K42.9 UMBILICAL HERNIA W/OUT OBSTRUCTION OR GANGRENE: ICD-10-CM

## 2023-12-21 DIAGNOSIS — Z12.5 ENCOUNTER FOR SCREENING FOR MALIGNANT NEOPLASM OF PROSTATE: ICD-10-CM

## 2024-05-08 NOTE — ED PROVIDER NOTE - ENMT, MLM
AFTER YOUR COLONOSCOPY    Date of Procedure:  5/8/2024    You had the following procedure(s) performed today:  Colonoscopy    Exam Results:  The physician removed 2   polyp(s), which will be sent to the lab for testing. Results can take up to 14 business days to be communicated to you.  Your physician will contact you with the result of your biopsy and when your follow-up colonoscopy is due    Diet Instructions:  You may resume your regular diet today. It is recommended to avoid heavy, greasy, and spicy foods today.    Medications:  You may resume your medications as prescribed. and Do not use aspirin or ibuprofen (Advil, Motrin, etc.) or other NSAIDS (anti-inflammatory drugs like Aleve or Naprosyn) for 14 days.  You may use Tylenol (acetaminophen) for relief is necessary.    Activity for Today:    DO NOT DRIVE.  Do not operate any other heavy machinery or equipment.  Avoid activities that require coordination (climbing ladders, using a knife/cooking equipment, etc.)  Do not make important financial or legal decisions  Do not return to work.  Do not drink any alcohol.  Rest the remainder of the day.     ** You may resume your activity tomorrow.    It is NORMAL to have.....     Colonoscopy / Sigmoidoscopy    Mild abdominal distention and/or cramping are normal after these procedures, but should pass within an hour or two with the passage of air.  A small amount of rectal bleeding (a few streaks of blood on the toilet paper) is normal following biopsy, polypectomy or if you have hemorrhoids.   Mild nausea and/or vomiting       Please CALL Dr. Lopez - [phone]     For Colonoscopy     If you have unusual belly pain that is NOT relieved with passing air  If you have rectal bleeding that is more than just a few streaks of blood on the toilet tissue  If you have moderate nausea and/or vomiting         Go to the EMERGENCY ROOM if you experience any of the following:  Severe pain  Difficulty breathing or  swallowing  Persistent vomiting  Vomiting blood, either brown (coffee ground in consistency) or red  Significant rectal bleeding of bright red blood  Black colored or bloody stool  Chills or fever over 101 degrees F.     Additional Instructions:  Any further questions after hours please contact [facility] 24 hours nurse call center at [phone].        Understanding Colon and Rectal Polyps  The colon (also called the large intestine) is a muscular tube that forms the last part of the digestive tract. It absorbs water and stores food waste. The colon is about 4 to 6 feet long. The rectum is the last 6 inches of the colon. The colon and rectum have a smooth lining composed of millions of cells. Changes in these cells can lead to growths called polyps in the colon. These can become cancerous and should be removed. Many tests are available to screen for colon cancer. But colonoscopy is the only test that looks directly into the entire large intestine and allows for treatment right away. During colonoscopy, these polyps can be removed. How often you need this test depends on many things. These include your condition, your family history, symptoms, and what the findings were at the previous colonoscopy.    When the colon lining changes  Changes that happen in the cells that line the colon or rectum can lead to growths called polyps. Over a period of years, polyps can turn into cancer. Removing polyps early may prevent cancer from ever forming.   Polyps  Polyps are fleshy clumps of tissue that form on the lining of the colon or rectum. Small polyps are usually not cancer (benign). But over time, cells in a polyp can change and become precancerous. Certain types of polyps known as adenomatous polyps and serrated polyps are precancerous. The risk for cancer also increases with the size of the polyp and certain cell and gene features. This means that they may become cancerous if they're not removed. Hyperplastic  polyps are benign. They can grow quite large and not turn cancerous.      Cancer  Almost all colorectal cancers start when polyp cells start growing abnormally. As a cancerous tumor grows, it may affect more and more of the colon or rectum. In time, cancer can also grow beyond the colon or rectum and spread to nearby organs or to glands called lymph nodes. The cells can also travel to other parts of the body. This is known as metastasis. The earlier a cancerous tumor is removed, the better the chance of preventing its spread.     [employer] last reviewed this educational content on 10/1/2021    © 7731-4656 [employer]. All rights reserved. This information is not intended as a substitute for professional medical care. Always follow your healthcare professional's instructions.           Want to Say “Thank You” to a Nurse?  The [employer] Award® was created in memory of MIKE Gandhi by his family to say thank you to bedside nurses who provide an outstanding level of care.  Submit a nomination using any method below.     OR    [email or web address]              [employer] patent

## 2025-04-21 ENCOUNTER — APPOINTMENT (OUTPATIENT)
Dept: UROLOGY | Facility: CLINIC | Age: 75
End: 2025-04-21

## 2025-05-12 ENCOUNTER — NON-APPOINTMENT (OUTPATIENT)
Age: 75
End: 2025-05-12

## 2025-05-12 ENCOUNTER — APPOINTMENT (OUTPATIENT)
Dept: UROLOGY | Facility: CLINIC | Age: 75
End: 2025-05-12
Payer: MEDICARE

## 2025-05-12 VITALS
BODY MASS INDEX: 41.78 KG/M2 | SYSTOLIC BLOOD PRESSURE: 132 MMHG | HEART RATE: 75 BPM | TEMPERATURE: 97.8 F | WEIGHT: 260 LBS | DIASTOLIC BLOOD PRESSURE: 67 MMHG | HEIGHT: 66 IN

## 2025-05-12 DIAGNOSIS — N35.914 UNSPECIFIED ANTERIOR URETHRAL STRICTURE, MALE: ICD-10-CM

## 2025-05-12 DIAGNOSIS — R33.9 RETENTION OF URINE, UNSPECIFIED: ICD-10-CM

## 2025-05-12 PROCEDURE — 51705 CHANGE OF BLADDER TUBE: CPT

## 2025-07-24 ENCOUNTER — OFFICE (OUTPATIENT)
Dept: URBAN - METROPOLITAN AREA CLINIC 76 | Facility: CLINIC | Age: 75
Setting detail: OPHTHALMOLOGY
End: 2025-07-24
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
## 2025-07-24 PROCEDURE — NO SHOW FE NO SHOW FEE: Performed by: OPTOMETRIST

## 2025-08-26 ENCOUNTER — OFFICE (OUTPATIENT)
Dept: URBAN - METROPOLITAN AREA CLINIC 76 | Facility: CLINIC | Age: 75
Setting detail: OPHTHALMOLOGY
End: 2025-08-26
Payer: MEDICARE

## 2025-08-26 DIAGNOSIS — H52.4: ICD-10-CM

## 2025-08-26 DIAGNOSIS — H25.093: ICD-10-CM

## 2025-08-26 DIAGNOSIS — E11.3293: ICD-10-CM

## 2025-08-26 PROCEDURE — 92014 COMPRE OPH EXAM EST PT 1/>: CPT | Performed by: OPHTHALMOLOGY

## 2025-08-26 PROCEDURE — 92015 DETERMINE REFRACTIVE STATE: CPT | Performed by: OPHTHALMOLOGY

## 2025-08-26 ASSESSMENT — REFRACTION_MANIFEST
OD_SPHERE: -2.00
OD_VA1: 20/100
OD_VA2: 20/60(J6)
OS_CYLINDER: SPHERE
OD_VA2: 20/60(J6)
OS_ADD: +2.75
OD_CYLINDER: SPHERE
OS_VA2: 20/60(J6)
OS_SPHERE: +2.75
OD_CYLINDER: -0.75
OS_ADD: +2.75
OD_AXIS: 100
OS_VA1: 20/100
OD_SPHERE: +2.75
OS_SPHERE: -2.00
OS_SPHERE: PLANO
OD_ADD: +2.75
OD_ADD: +2.75
OS_VA2: 20/60(J6)
OS_CYLINDER: SPHERE
OD_VA2: 20/60(J6)
OS_VA2: 20/60(J6)
OD_CYLINDER: SPHERE
OD_SPHERE: +1.00

## 2025-08-26 ASSESSMENT — VISUAL ACUITY
OS_BCVA: 20/200
OD_BCVA: 20/100

## 2025-08-26 ASSESSMENT — CONFRONTATIONAL VISUAL FIELD TEST (CVF)
OD_FINDINGS: FULL
OS_FINDINGS: FULL